# Patient Record
Sex: FEMALE | Race: BLACK OR AFRICAN AMERICAN | NOT HISPANIC OR LATINO | Employment: UNEMPLOYED | URBAN - METROPOLITAN AREA
[De-identification: names, ages, dates, MRNs, and addresses within clinical notes are randomized per-mention and may not be internally consistent; named-entity substitution may affect disease eponyms.]

---

## 2021-08-22 ENCOUNTER — HOSPITAL ENCOUNTER (EMERGENCY)
Facility: CLINIC | Age: 46
Discharge: HOME | End: 2021-08-22
Attending: EMERGENCY MEDICINE | Admitting: EMERGENCY MEDICINE
Payer: COMMERCIAL

## 2021-08-22 VITALS
HEART RATE: 64 BPM | TEMPERATURE: 98.3 F | SYSTOLIC BLOOD PRESSURE: 116 MMHG | RESPIRATION RATE: 16 BRPM | OXYGEN SATURATION: 100 % | DIASTOLIC BLOOD PRESSURE: 84 MMHG

## 2021-08-22 DIAGNOSIS — H16.201 KERATOCONJUNCTIVITIS OF RIGHT EYE: ICD-10-CM

## 2021-08-22 PROCEDURE — 250N000009 HC RX 250

## 2021-08-22 PROCEDURE — 99284 EMERGENCY DEPT VISIT MOD MDM: CPT | Performed by: EMERGENCY MEDICINE

## 2021-08-22 PROCEDURE — 99283 EMERGENCY DEPT VISIT LOW MDM: CPT | Performed by: EMERGENCY MEDICINE

## 2021-08-22 RX ORDER — PROPARACAINE HYDROCHLORIDE 5 MG/ML
1 SOLUTION/ DROPS OPHTHALMIC ONCE
Status: COMPLETED | OUTPATIENT
Start: 2021-08-22 | End: 2021-08-22

## 2021-08-22 RX ORDER — TOBRAMYCIN 3 MG/ML
2 SOLUTION/ DROPS OPHTHALMIC
Qty: 4 ML | Refills: 0 | Status: SHIPPED | OUTPATIENT
Start: 2021-08-22 | End: 2021-08-24

## 2021-08-22 RX ORDER — PROPARACAINE HYDROCHLORIDE 5 MG/ML
SOLUTION/ DROPS OPHTHALMIC
Status: COMPLETED
Start: 2021-08-22 | End: 2021-08-22

## 2021-08-22 RX ADMIN — PROPARACAINE HYDROCHLORIDE 1 DROP: 5 SOLUTION/ DROPS OPHTHALMIC at 17:15

## 2021-08-22 RX ADMIN — FLUORESCEIN SODIUM 1 STRIP: 1 STRIP OPHTHALMIC at 17:20

## 2021-08-22 ASSESSMENT — VISUAL ACUITY
OS: 20/200
OD: 20/200

## 2021-08-22 NOTE — ED PROVIDER NOTES
"  History     Chief Complaint   Patient presents with     Eye Problem     \"She got an eye injury from the sun, through the reflection off the car window last Saturday.\" Pain and swelling has incrased since then to right eye.      HPI  Sarah Andrade is a 46 year old female who presents to the ER with a painful right swollen eye that she has had for the last week.  Patient thinks she injured her eye from the sun through the reflection off the car window last Saturday and states the pain and swelling has increased since that time to her right eye.  Patient denies any fevers and states her left eye is uninvolved.    I have reviewed the Medications, Allergies, Past Medical and Surgical History, and Social History in the Epic system.    History reviewed. No pertinent past medical history.    History reviewed. No pertinent surgical history.         Past medical history, past surgical history, medications, and allergies were reviewed with the patient. Additional pertinent items: None    History reviewed. No pertinent family history.    Social History     Tobacco Use     Smoking status: Not on file   Substance Use Topics     Alcohol use: Not on file     Social history was reviewed with the patient. Additional pertinent items: None    No Known Allergies    Review of Systems  A complete review of systems was performed with pertinent positives and negatives noted in the HPI, and all other systems negative.    Physical Exam   BP: 119/78  Pulse: 70  Temp: 97.6  F (36.4  C)  Resp: 16  SpO2: 98 %      Physical Exam  Vitals and nursing note reviewed.   Constitutional:       Appearance: She is not ill-appearing.      Comments:  used   HENT:      Head: Atraumatic.   Eyes:      Comments: Patient's pupils are approximately 2 to 3 mm bilaterally and reactive.    The patient's left eye is within normal limits    The patient's right eye has some eyelid edema with significant chemosis of the right eye.  Slit-lamp exam reveals " the cornea to be diffusely inflamed with a keratitis.  Alcaine drops do relieve some of the pain.  Cell and flare is hard to detect but the right is not suspected.   Musculoskeletal:      Cervical back: Neck supple.   Neurological:      General: No focal deficit present.      Mental Status: She is alert and oriented to person, place, and time.   Psychiatric:         Mood and Affect: Mood normal.         ED Course        Procedures             Assessments & Plan (with Medical Decision Making)     I have reviewed the nursing notes.    Medications   fluorescein (FUL-JACQUE) 1 MG ophthalmic strip (has no administration in time range)   proparacaine (ALCAINE) 0.5 % ophthalmic solution (has no administration in time range)        I have reviewed the findings, diagnosis, plan and need for follow up with the patient.    New Prescriptions    TOBRAMYCIN (TOBREX) 0.3 % OPHTHALMIC SOLUTION    Place 2 drops into the right eye every 4 hours (while awake) for 7 days     Orders Placed This Encounter   Procedures     Adult Eye Referral         Final diagnoses:   Keratoconjunctivitis of right eye     Please fill your eyedrop prescription and start them as soon as possible.    A referral has been placed into the computer system for you to be seen in the eye clinic.  They should call you in the next 24 hours to schedule your appointment to be seen in the next 1 to 2 days.  If they don't call you please call them at Eye Clinic (phone: [phone]).    Work note given    Jimmie Scherer MD, MD    8/22/2021   [facility] EMERGENCY DEPARTMENT     Jimmie Scherer MD  08/22/21 0840

## 2021-08-22 NOTE — LETTER
August 22, 2021      To Whom It May Concern:      Sarah Andrade was seen in our Emergency Department today, 08/22/21.  I expect her condition to improve over the next several days.  She may return to work/school on 8/25/21.    Sincerely,        Jimmie Scherer MD, MD

## 2021-08-23 ENCOUNTER — TELEPHONE (OUTPATIENT)
Dept: OPHTHALMOLOGY | Facility: CLINIC | Age: 46
End: 2021-08-23

## 2021-08-23 NOTE — TELEPHONE ENCOUNTER
I called the patient with the help of  services.  I scheduled the patient for an appointment.  The patient asked that I call the emergency contact.  I called and confirmed location, date and time.

## 2021-08-23 NOTE — TELEPHONE ENCOUNTER
[facility] Call Center    Phone Message    May a detailed message be left on voicemail: no     Reason for Call: Appointment Intake    Referring Provider Name: Jimmie Scherer MD at [facility] Emergency Department  Diagnosis and/or Symptoms: Keratoconjunctivitis of right eye    Action Taken: Message routed to:  [facility] ([facility] OPHTHALMOLOGY [facility]ULT [facility] [[id number]] - per HFU protocols    Travel Screening: Not Applicable

## 2021-08-24 ENCOUNTER — TELEPHONE (OUTPATIENT)
Dept: OPHTHALMOLOGY | Facility: CLINIC | Age: 46
End: 2021-08-24

## 2021-08-24 ENCOUNTER — LAB (OUTPATIENT)
Dept: LAB | Facility: CLINIC | Age: 46
End: 2021-08-24
Payer: COMMERCIAL

## 2021-08-24 ENCOUNTER — ANCILLARY PROCEDURE (OUTPATIENT)
Dept: GENERAL RADIOLOGY | Facility: CLINIC | Age: 46
End: 2021-08-24
Attending: STUDENT IN AN ORGANIZED HEALTH CARE EDUCATION/TRAINING PROGRAM
Payer: COMMERCIAL

## 2021-08-24 ENCOUNTER — APPOINTMENT (OUTPATIENT)
Dept: INTERPRETER SERVICES | Facility: CLINIC | Age: 46
End: 2021-08-24
Payer: COMMERCIAL

## 2021-08-24 ENCOUNTER — OFFICE VISIT (OUTPATIENT)
Dept: OPHTHALMOLOGY | Facility: CLINIC | Age: 46
End: 2021-08-24
Attending: OPHTHALMOLOGY
Payer: COMMERCIAL

## 2021-08-24 DIAGNOSIS — H20.9 ANTERIOR UVEITIS: ICD-10-CM

## 2021-08-24 DIAGNOSIS — H20.9 ANTERIOR UVEITIS: Primary | ICD-10-CM

## 2021-08-24 DIAGNOSIS — H40.003 GLAUCOMA SUSPECT OF BOTH EYES: ICD-10-CM

## 2021-08-24 LAB — T PALLIDUM AB SER QL: NONREACTIVE

## 2021-08-24 PROCEDURE — 85549 MURAMIDASE: CPT | Mod: 90 | Performed by: PATHOLOGY

## 2021-08-24 PROCEDURE — 86780 TREPONEMA PALLIDUM: CPT | Mod: 90 | Performed by: PATHOLOGY

## 2021-08-24 PROCEDURE — G0463 HOSPITAL OUTPT CLINIC VISIT: HCPCS

## 2021-08-24 PROCEDURE — 36415 COLL VENOUS BLD VENIPUNCTURE: CPT | Performed by: PATHOLOGY

## 2021-08-24 PROCEDURE — 86481 TB AG RESPONSE T-CELL SUSP: CPT | Mod: 90 | Performed by: PATHOLOGY

## 2021-08-24 PROCEDURE — 92133 CPTRZD OPH DX IMG PST SGM ON: CPT | Performed by: STUDENT IN AN ORGANIZED HEALTH CARE EDUCATION/TRAINING PROGRAM

## 2021-08-24 PROCEDURE — 71046 X-RAY EXAM CHEST 2 VIEWS: CPT | Mod: GC | Performed by: RADIOLOGY

## 2021-08-24 PROCEDURE — 99204 OFFICE O/P NEW MOD 45 MIN: CPT | Mod: GC | Performed by: STUDENT IN AN ORGANIZED HEALTH CARE EDUCATION/TRAINING PROGRAM

## 2021-08-24 PROCEDURE — 82164 ANGIOTENSIN I ENZYME TEST: CPT | Mod: 90 | Performed by: PATHOLOGY

## 2021-08-24 RX ORDER — PREDNISOLONE ACETATE 10 MG/ML
1 SUSPENSION/ DROPS OPHTHALMIC
Qty: 30 ML | Refills: 1 | Status: SHIPPED | OUTPATIENT
Start: 2021-08-24 | End: 2021-08-30

## 2021-08-24 RX ORDER — CYCLOPENTOLATE HYDROCHLORIDE 10 MG/ML
1 SOLUTION/ DROPS OPHTHALMIC 3 TIMES DAILY
Qty: 5 ML | Refills: 0 | Status: SHIPPED | OUTPATIENT
Start: 2021-08-24 | End: 2021-08-24

## 2021-08-24 RX ORDER — ATROPINE SULFATE 10 MG/ML
1 SOLUTION/ DROPS OPHTHALMIC 2 TIMES DAILY
Qty: 2 ML | Refills: 0 | Status: SHIPPED | OUTPATIENT
Start: 2021-08-24 | End: 2021-08-30

## 2021-08-24 ASSESSMENT — SLIT LAMP EXAM - LIDS: COMMENTS: NORMAL

## 2021-08-24 ASSESSMENT — VISUAL ACUITY
OS_SC: 20/60
OD_SC: 20/30
OS_PH_SC: 20/30
METHOD: SNELLEN - LINEAR

## 2021-08-24 ASSESSMENT — CUP TO DISC RATIO
OD_RATIO: 0.7
OS_RATIO: 0.7

## 2021-08-24 ASSESSMENT — CONF VISUAL FIELD
OD_SUPERIOR_NASAL_RESTRICTION: 3
OD_INFERIOR_TEMPORAL_RESTRICTION: 3
OD_SUPERIOR_TEMPORAL_RESTRICTION: 3
OD_INFERIOR_NASAL_RESTRICTION: 3
OS_NORMAL: 1

## 2021-08-24 ASSESSMENT — EXTERNAL EXAM - LEFT EYE: OS_EXAM: NORMAL

## 2021-08-24 ASSESSMENT — TONOMETRY
OD_IOP_MMHG: 19
OS_IOP_MMHG: 12
IOP_METHOD: ICARE

## 2021-08-24 NOTE — NURSING NOTE
Chief Complaints and History of Present Illnesses   Patient presents with     Follow Up     Follow up from ED for Keratoconjunctivitis of right eye     Chief Complaint(s) and History of Present Illness(es)     Follow Up     Comments: Follow up from ED for Keratoconjunctivitis of right eye              Comments     Pt here with  over the phone today.  Pt here for follow up from ED for Keratoconjunctivitis of right eye.  Pt states vision is slightly better over the past 2 days.   Pt having throbbing eye pain in RE today. Pt also states swelling and redness is worse than 2 days ago.    Deonte Rajan [facility] August 24, 2021 8:11 AM

## 2021-08-24 NOTE — PROGRESS NOTES
"Chief Complaint(s) and History of Present Illness(es)     Follow Up     Comments: Follow up from ED for Keratoconjunctivitis of right eye      Comments     Pt here with  over the phone today.  Pt here for follow up from ED for Keratoconjunctivitis of right eye.  Pt states vision is slightly better over the past 2 days.   Pt having throbbing eye pain in RE today. Pt also states swelling and   redness is worse than 2 days ago.  Deonte RajanCHRISTOPHER August 24, 2021 8:11 AM    Patient presents to acute clinic for evaluation of right eye redness and pain. This started about 1.5 weeks ago. There was no trauma, but she believes this is associated with staring at the sun. The symptoms started gradually, first redness on Saturday and then pain on Tuesday. The pain is intermittent, and worse with bending over. The right eye is photophobic. The right eye tears, no thick discharge. Her right eye vision feels \"cloudy.\"  No recent illnesses. No history of allergies. No sick contacts. She has had similar episodes of right eye redness in the past, but her last episode [place],\" and prior episodes have resolved on their own. No shortness of breath, cough, joint aches, muscle aches, or fevers. No recent insect bites. No history of cold sores or mouth sores.     She started tobramycin on Sunday (2 days ago) after being seen at the ED, and believes that this helps the pain slightly.     No left eye issues or symptoms.     POH:  - History of right eye redness that resolved on its own in the past.   - Has never seen an eye doctor in the past.   - Glasses: Readers only.   - Contact lens wear: None  - Previous eye surgery/laser: None    PMH:   - No DM or HTN.   - No autoimmune diseases.     FH:   - No known family history of autoimmune conditions.   - No known family history of glaucoma or macular degeneration.     Review of systems for the eyes was negative other than the pertinent " positives/negatives listed in the HPI.      Assessment & Plan      Sarah Andrade is a 46 year old female with the following diagnoses:   1. Anterior uveitis - Right Eye    2. Glaucoma suspect of both eyes       First documented episode  ? Possible previously untreated episodes per report  Incidental cupping noted in both eyes   OCT RNFL of both eyes today shows good healthy RNFL in both eyes, suggesting large cups are anatomical rather than pathological, glaucoma less likely. Monitor      Plan:  - Will order the following studies for further assessment:   - ACE, lysozyme, CXR, syphillis, TB quant gold.   - Start Atropine BID in the right eye.  - Start Pred Forte q1h while awake in right eye.  - Plan for optos photos both eyes at next visit (difficult to view fundus in right eye today).   - Follow-up in about 1 week acute clinic.   - Return precautions discussed.     Patient disposition:   Return in about 1 week (around 8/31/2021) for Follow-up, V/T/D, optos photos of both eyes. .    Alberto Long MD  Ophthalmology Resident, PGY-2    Attending Physician Attestation:  Complete documentation of historical and exam elements from today's encounter can be found in the full encounter summary report (not reduplicated in this progress note).  I personally obtained the chief complaint(s) and history of present illness.  I confirmed and edited as necessary the review of systems, past medical/surgical history, family history, social history, and examination findings as documented by others; and I examined the patient myself.  I personally reviewed the relevant tests, images, and reports as documented above.  I formulated and edited as necessary the assessment and plan and discussed the findings and management plan with the patient and family. . - Johnny Moss MD

## 2021-08-24 NOTE — TELEPHONE ENCOUNTER
Atropine prescribed    No indication to pursue cyclopentolate PA further    Note to Dr Ethan Hughes, RN 12:43 PM 08/24/21

## 2021-08-24 NOTE — TELEPHONE ENCOUNTER
Does the provider still want the PA for the cyclopentolate.  It looks like it was discontinued and replaced by atropine

## 2021-08-24 NOTE — TELEPHONE ENCOUNTER
Prior Authorization Retail Medication Request    Medication/Dose: cyclopentolate 1%  ICD code (if different than what is on RX):  H209  Previously Tried and Failed:  unknown  Rationale:  unknown    Insurance Name:  [facility]  Insurance ID:  [id number]      Pharmacy Information (if different than what is on RX)  Name:  [facility]  Phone:  [phone]

## 2021-08-24 NOTE — PATIENT INSTRUCTIONS
- Start Atropine eyedrop 2 times per day in the right eye.  - Start Pred Forte eyedrop once every hour while awake in right eye.    - Stop taking the old antibiotic tobramycin eyedrop.     - Go to the laboratory to have your blood drawn. They will also schedule you to go get a chest X-ray.     - Schedule a follow-up appointment at the acute clinic for one week from now. Return sooner if issues arise.

## 2021-08-25 LAB
ACE SERPL-CCNC: 14 U/L
QUANTIFERON MITOGEN: 10 IU/ML
QUANTIFERON NIL TUBE: 0.13 IU/ML
QUANTIFERON TB1 TUBE: 1.2 IU/ML
QUANTIFERON TB2 TUBE: 1.01

## 2021-08-26 LAB
GAMMA INTERFERON BACKGROUND BLD IA-ACNC: 0.13 IU/ML
LYSOZYME SERPL-MCNC: 0.73 UG/ML
M TB IFN-G BLD-IMP: POSITIVE
M TB IFN-G CD4+ BCKGRND COR BLD-ACNC: 9.87 IU/ML
MITOGEN IGNF BCKGRD COR BLD-ACNC: 0.88 IU/ML
MITOGEN IGNF BCKGRD COR BLD-ACNC: 1.07 IU/ML

## 2021-08-30 ENCOUNTER — OFFICE VISIT (OUTPATIENT)
Dept: OPHTHALMOLOGY | Facility: CLINIC | Age: 46
End: 2021-08-30
Attending: STUDENT IN AN ORGANIZED HEALTH CARE EDUCATION/TRAINING PROGRAM
Payer: COMMERCIAL

## 2021-08-30 DIAGNOSIS — H20.9 ANTERIOR UVEITIS: Primary | ICD-10-CM

## 2021-08-30 DIAGNOSIS — R76.12 POSITIVE QUANTIFERON-TB GOLD TEST: ICD-10-CM

## 2021-08-30 DIAGNOSIS — H40.003 GLAUCOMA SUSPECT OF BOTH EYES: ICD-10-CM

## 2021-08-30 PROCEDURE — 99213 OFFICE O/P EST LOW 20 MIN: CPT | Mod: GC | Performed by: STUDENT IN AN ORGANIZED HEALTH CARE EDUCATION/TRAINING PROGRAM

## 2021-08-30 PROCEDURE — 92250 FUNDUS PHOTOGRAPHY W/I&R: CPT | Performed by: STUDENT IN AN ORGANIZED HEALTH CARE EDUCATION/TRAINING PROGRAM

## 2021-08-30 PROCEDURE — G0463 HOSPITAL OUTPT CLINIC VISIT: HCPCS

## 2021-08-30 RX ORDER — PREDNISOLONE ACETATE 10 MG/ML
1 SUSPENSION/ DROPS OPHTHALMIC
Qty: 30 ML | Refills: 1 | Status: SHIPPED | OUTPATIENT
Start: 2021-08-30 | End: 2021-09-13

## 2021-08-30 RX ORDER — ATROPINE SULFATE 10 MG/ML
1 SOLUTION/ DROPS OPHTHALMIC DAILY
Qty: 2 ML | Refills: 0 | Status: SHIPPED | OUTPATIENT
Start: 2021-08-30 | End: 2021-10-08

## 2021-08-30 ASSESSMENT — SLIT LAMP EXAM - LIDS
COMMENTS: NORMAL
COMMENTS: NORMAL

## 2021-08-30 ASSESSMENT — CUP TO DISC RATIO
OS_RATIO: 0.7
OD_RATIO: 0.7

## 2021-08-30 ASSESSMENT — VISUAL ACUITY
OS_SC: 20/60
METHOD: SNELLEN - LINEAR
OD_PH_SC: 20/30
OS_PH_SC: 20/30
OD_SC: 20/40
OD_SC+: -2

## 2021-08-30 ASSESSMENT — CONF VISUAL FIELD
OS_NORMAL: 1
OD_INFERIOR_TEMPORAL_RESTRICTION: 3
OD_INFERIOR_NASAL_RESTRICTION: 3
OD_SUPERIOR_TEMPORAL_RESTRICTION: 3
OD_SUPERIOR_NASAL_RESTRICTION: 3

## 2021-08-30 ASSESSMENT — EXTERNAL EXAM - LEFT EYE: OS_EXAM: NORMAL

## 2021-08-30 ASSESSMENT — TONOMETRY
OS_IOP_MMHG: 14
IOP_METHOD: TONOPEN
OD_IOP_MMHG: 13

## 2021-08-30 NOTE — PATIENT INSTRUCTIONS
- Take the pred forte eyedrop once every 2 hours in the right eye while awake.   - Take the atropine eyedrop once per day in the right eye.     - Please schedule follow-up appointment at the uveitis eye clinic at the [facility] in about 2 weeks.     - You were also given a referral to the infectious disease clinic. Please make an appointment at this clinic to discuss your positive TB test.

## 2021-08-30 NOTE — PROGRESS NOTES
"HPI:  Sarah Andrade is a 46 year old female who presents to acute eye clinic for follow-up of uveitis.     Initial HPI below:  Patient presented to acute clinic for evaluation of right eye redness and pain. This started about in early to mid August. There was no trauma, but she believes this is associated with staring at the sun. The symptoms started gradually, first redness on Saturday and then pain on Tuesday. The pain is intermittent, and worse with bending over. The right eye is photophobic. The right eye tears, no thick discharge. Her right eye vision feels \"cloudy.\"  No recent illnesses. No history of allergies. No sick contacts. She has had similar episodes of right eye redness in the past, but her last episode was years ago [place],\" and prior episodes have resolved on their own. No shortness of breath, cough, joint aches, muscle aches, or fevers. No recent insect bites. No history of cold sores or mouth sores.    Interval Hx 8/30/21:  Today Sarah feels that her vision has improved since last week, and her eye pain is resolved. Her eyes feel back to normal. No new flashes or floaters. She has been taking her drops as directed.      POH  - History of right eye redness that resolved on its own in the past.   - Has never seen an eye doctor in the past.   - Glasses: Readers only.   - Contact lens wear: None.  - Previous eye surgery/laser: None    GTTS  - Atropine BID right eye  - Pred Forte q1h while awake right eye    PMH  - No DM or HTN. Has received bCG vaccination.   - No autoimmune diseases.    FH  - No known family history of autoimmune conditions.   - No known family history of glaucoma or macular degeneration.     Assessment & Plan     # Acute anterior uveitis, right eye  Overall improved today, TB quant gold positive, chest x-ray unremarkable. Negative for ACE, lysozyme, and syphilis. Possible previously untreated prior episodes per patient report. Improved symptoms today after taking " atropine BID in the right eye and pred forte q1h while awake in the right eye. Overall low suspicion for TB as etiology for anterior uveitis, but will plan on referral to infectious disease clinic as below.   Plan:  - Decrease pred forte eyedrop to q2h in the right eye while awake.   - Decrease atropine to once a day in the right eye  - Follow-up in uveitis clinic in about 2 weeks.   - Return precautions discussed.    # Positive TB Quant Gold test  Patient does not have any history of suspicious illness, no history of TB in the past, no treatment for TB in the past. She recalls an exposure to TB in the past, almost 13 years ago. Patient is from \[facility]". CXR did not show evidence of disease. Discussed with patient and her Brother that bCG vaccine should not result in Quantiferon positivity.     Plan:  - Refer to infectious disease clinic to consider perhaps at least prophylactic treatment. Uveitis has responded to topical steroid so do not anticipate oral steroid will be necessary    # Glaucoma suspect of both eyes  OCT RNFL of both eyes 8/24/21 qirh healthy RNFL in both eyes, suggesting large cups are anatomical rather than pathological, glaucoma less likely. Intraocular pressures within normal limits.   - Monitor without drops for eye pressure     -----------------------------------------------------------------------------------    Patient disposition:   Return in about 2 weeks (around 9/13/2021) for Follow up, uveitis clinic V/T/D. with Destiney    Patient seen and discussed with Dr. Cabello.    Alberto Long MD  Ophthalmology Resident, PGY-2    Attending Physician Attestation:  Complete documentation of historical and exam elements from today's encounter can be found in the full encounter summary report (not reduplicated in this progress note). I reviewed the chief complaint(s) and history of present illness, and  confirmed and edited as necessary the review of systems, past medical/surgical history, family  history, social history, and examination findings as documented by others and the treating Resident or Fellow Physician.    I examined the patient myself, discussed the findings, reviewed all ancillary testing data and modified these results and reports along with the assessment and plan with the Treating Resident or Fellow Physician. I agree with the note as detailed above.   Dontae Cabello M.D.  Uveitis and Medical Retina  August 30, 2021

## 2021-08-30 NOTE — NURSING NOTE
Chief Complaints and History of Present Illnesses   Patient presents with     Uveitis Follow-Up     1 week follow up Anterior Uveitis.     Chief Complaint(s) and History of Present Illness(es)     Uveitis Follow-Up     Comments: 1 week follow up Anterior Uveitis.              Comments     Pt here with  over the phone today.  Pt states vision has improved since last week.  No eye pain today. No new flashes or floaters.    CHRISTOPHER Roland August 30, 2021 8:52 AM

## 2021-08-31 ENCOUNTER — APPOINTMENT (OUTPATIENT)
Dept: INTERPRETER SERVICES | Facility: CLINIC | Age: 46
End: 2021-08-31
Payer: COMMERCIAL

## 2021-09-02 ENCOUNTER — APPOINTMENT (OUTPATIENT)
Dept: INTERPRETER SERVICES | Facility: CLINIC | Age: 46
End: 2021-09-02
Payer: COMMERCIAL

## 2021-09-13 ENCOUNTER — OFFICE VISIT (OUTPATIENT)
Dept: OPHTHALMOLOGY | Facility: CLINIC | Age: 46
End: 2021-09-13
Attending: OPHTHALMOLOGY
Payer: COMMERCIAL

## 2021-09-13 DIAGNOSIS — R76.12 POSITIVE QUANTIFERON-TB GOLD TEST: ICD-10-CM

## 2021-09-13 DIAGNOSIS — H20.00 ACUTE ANTERIOR UVEITIS OF RIGHT EYE: Primary | ICD-10-CM

## 2021-09-13 DIAGNOSIS — H40.003 GLAUCOMA SUSPECT OF BOTH EYES: ICD-10-CM

## 2021-09-13 DIAGNOSIS — H20.9 ANTERIOR UVEITIS: ICD-10-CM

## 2021-09-13 PROCEDURE — 99214 OFFICE O/P EST MOD 30 MIN: CPT | Performed by: OPHTHALMOLOGY

## 2021-09-13 PROCEDURE — G0463 HOSPITAL OUTPT CLINIC VISIT: HCPCS

## 2021-09-13 RX ORDER — PREDNISOLONE ACETATE 10 MG/ML
SUSPENSION/ DROPS OPHTHALMIC
Qty: 5 ML | Refills: 2 | Status: SHIPPED | OUTPATIENT
Start: 2021-09-13 | End: 2021-10-08

## 2021-09-13 ASSESSMENT — EXTERNAL EXAM - LEFT EYE: OS_EXAM: NORMAL

## 2021-09-13 ASSESSMENT — CONF VISUAL FIELD
OD_INFERIOR_NASAL_RESTRICTION: 3
OD_INFERIOR_TEMPORAL_RESTRICTION: 3
OD_SUPERIOR_TEMPORAL_RESTRICTION: 3
OD_SUPERIOR_NASAL_RESTRICTION: 3
OS_NORMAL: 1
METHOD: COUNTING FINGERS

## 2021-09-13 ASSESSMENT — VISUAL ACUITY
OD_SC: 20/40
METHOD: SNELLEN - LINEAR
OS_SC+: -1
OD_PH_SC: 20/30
OS_SC: 20/30

## 2021-09-13 ASSESSMENT — SLIT LAMP EXAM - LIDS
COMMENTS: NORMAL
COMMENTS: NORMAL

## 2021-09-13 ASSESSMENT — CUP TO DISC RATIO
OS_RATIO: 0.7
OD_RATIO: 0.7

## 2021-09-13 ASSESSMENT — TONOMETRY
OS_IOP_MMHG: 16
OD_IOP_MMHG: 13
IOP_METHOD: TONOPEN

## 2021-09-13 NOTE — LETTER
9/13/2021        RE: Sarah Andrade  [address]  [place] [address]     Dear Colleague,    Thank you for referring your patient, Sarah Andrade, to the [facility] EYE [facility] at [facility]. Please see a copy of my visit note below.    Chief Complaint/Presenting Concern: Uveitis follow-up    Interval History of Present Ocular Illness:  Sarah Andrade is a 46 year old patient who returns for follow up of her acute anterior uveitis of the right eye.  She was last seen on August 30, 2021 at which time she has improved inflammation in the right eye and normal eye pressure.  We discussed the positive QuantiFERON gold blood test and recommended consultation with infectious disease which is scheduled in a few weeks.    Since that visit, Ms. Andrade reports that she ran out of the drops one week ago as she ran out of the medicines. She reports mild cloudiness of the vision in the right eye, but no pain. Left eye still fine.     Interval Updates to Medical/Family/Social History:  No other changes to health or medications    Relevant Review of Systems Updates:  No coughs/colds, no fevers.    Laboratory Testing: None since last visit     Current eye related medications: No drops in 1 week (ran out)    Retina/Uveitis Imaging: No imaging today    Assessment:     1. Acute anterior uveitis of right eye  Some increase in symptoms likely due to persistent uveitis off drops for one week.     2. Glaucoma suspect of both eyes  Due to cupping but normal intraocular pressure in both eyes    3. Positive QuantiFERON-TB Gold test  Scheduled for infectious disease consultation next month for likely latent tuberculosis    Plan/Recommendations:      Discussed findings with patient and her Aunt who helped to translate in Occitan. There is still active uveitis in the right eye as drops not used last week. We anticipate that by restarting drops, that things will improve.      Eye pressure is 13, 16 today    Additional lab testing: None at this time.     Restart prednisolone drop 6x/day in the right eye    As pupil still dilated, no need to restart Atropine     Regarding the positive QuantiFERON test with a negative chest x-ray, this may not specifically be related to the uveitis.     RTC 2 weeks tonopen, no dilation, no testing    Physician Attestation     Attending Physician Attestation:  Complete documentation of historical and exam elements from today's encounter can be found in the full encounter summary report (not reduplicated in this progress note). I personally obtained the chief complaint(s) and history of present illness. I confirmed and edited as necessary the review of systems, past medical/surgical history, family history, social history, and examination findings as documented by others; and I examined the patient myself. I personally reviewed the relevant tests, images, and reports as documented above. I formulated and edited as necessary the assessment and plan and discussed the findings and management plan with the patient and family members present at the time of this visit.  Dontae Cabello M.D., Uveitis and Medical Retina, September 13, 2021     Again, thank you for allowing me to participate in the care of your patient.      Sincerely,    Dontae Cabello MD  [facility] Dept of Ophthalmology  Uveitis and Medical Retina

## 2021-09-13 NOTE — NURSING NOTE
Chief Complaints and History of Present Illnesses   Patient presents with     Uveitis Evaluation     Chief Complaint(s) and History of Present Illness(es)     Uveitis Evaluation     Laterality: right eye    Onset: gradual    Onset: months ago    Quality: States the va is very good      Severity: moderate    Frequency: intermittently    Associated symptoms: photophobia.  Negative for flashes and floaters    Pain scale: 0/10              Comments     Here for Anterior uveitis - Right Eye   Has not used the gtts for the past 7 days  Sloane Douglass COT 9:04 AM September 13, 2021

## 2021-09-13 NOTE — PROGRESS NOTES
Chief Complaint/Presenting Concern: Uveitis follow-up    Interval History of Present Ocular Illness:  Sarah Andrade is a 46 year old patient who returns for follow up of her acute anterior uveitis of the right eye.  She was last seen on August 30, 2021 at which time she has improved inflammation in the right eye and normal eye pressure.  We discussed the positive QuantiFERON gold blood test and recommended consultation with infectious disease which is scheduled in a few weeks.    Since that visit, Ms. Andrade reports that she ran out of the drops one week ago as she ran out of the medicines. She reports mild cloudiness of the vision in the right eye, but no pain. Left eye still fine.     Interval Updates to Medical/Family/Social History:  No other changes to health or medications    Relevant Review of Systems Updates:  No coughs/colds, no fevers.    Laboratory Testing: None since last visit     Current eye related medications: No drops in 1 week (ran out)    Retina/Uveitis Imaging: No imaging today    Assessment:     1. Acute anterior uveitis of right eye  Some increase in symptoms likely due to persistent uveitis off drops for one week.     2. Glaucoma suspect of both eyes  Due to cupping but normal intraocular pressure in both eyes    3. Positive QuantiFERON-TB Gold test  Scheduled for infectious disease consultation next month for likely latent tuberculosis    Plan/Recommendations:      Discussed findings with patient and her Aunt who helped to translate in Vietnamese. There is still active uveitis in the right eye as drops not used last week. We anticipate that by restarting drops, that things will improve.     Eye pressure is 13, 16 today    Additional lab testing: None at this time.     Restart prednisolone drop 6x/day in the right eye    As pupil still dilated, no need to restart Atropine     Regarding the positive QuantiFERON test with a negative chest x-ray, this may not specifically be related to the  uveitis.     RTC 2 weeks tonopen, no dilation, no testing    Physician Attestation     Attending Physician Attestation:  Complete documentation of historical and exam elements from today's encounter can be found in the full encounter summary report (not reduplicated in this progress note). I personally obtained the chief complaint(s) and history of present illness. I confirmed and edited as necessary the review of systems, past medical/surgical history, family history, social history, and examination findings as documented by others; and I examined the patient myself. I personally reviewed the relevant tests, images, and reports as documented above. I formulated and edited as necessary the assessment and plan and discussed the findings and management plan with the patient and family members present at the time of this visit.  Dontae Cabello M.D., Uveitis and Medical Retina, September 13, 2021

## 2021-09-13 NOTE — PATIENT INSTRUCTIONS
Please restart the steroid drop with the white cap (Prednislone) 6x/day in the right eye.     No need to restart atropine (red top) in the right eye

## 2021-09-13 NOTE — LETTER
September 13, 2021      Re: Sarah Andrade   1975    To Whom It May Concern:    This is to confirm that the above patient was seen on 9/13/2021.  Sarah Andrade is able to return to work tomorrow without restrictions.    Thank you for your cooperation in this matter.  Please do not hesitate to contact me if you have any further questions.    Sincerely,         FRANK GAONA,

## 2021-09-15 NOTE — TELEPHONE ENCOUNTER
RECORDS RECEIVED FROM: Internal   DATE RECEIVED: 10.12.2021    NOTES (Gather within 2 years) STATUS DETAILS   OFFICE NOTE from referring provider   Internal 08.30.2021 Alberto Long MD   OFFICE NOTE from other specialist N/A    DISCHARGE SUMMARY from hospital N/A    DISCHARGE REPORT from the ER N/A    LABS (any labs) Internal    MEDICATION LIST Internal    IMAGING  (NEED IMAGES AND REPORTS)     Osteomyelitis: Foot imaging  N/A    Liver Abscess: Abdominal imaging N/A    Other (anything related to diagnoses Internal 08.24.2021 XR CHEST 2 VW

## 2021-10-08 ENCOUNTER — OFFICE VISIT (OUTPATIENT)
Dept: OPHTHALMOLOGY | Facility: CLINIC | Age: 46
End: 2021-10-08
Attending: OPHTHALMOLOGY
Payer: COMMERCIAL

## 2021-10-08 DIAGNOSIS — H20.00 ACUTE ANTERIOR UVEITIS OF RIGHT EYE: Primary | ICD-10-CM

## 2021-10-08 DIAGNOSIS — R76.12 POSITIVE QUANTIFERON-TB GOLD TEST: ICD-10-CM

## 2021-10-08 DIAGNOSIS — H40.003 GLAUCOMA SUSPECT OF BOTH EYES: ICD-10-CM

## 2021-10-08 PROCEDURE — 99213 OFFICE O/P EST LOW 20 MIN: CPT | Performed by: OPHTHALMOLOGY

## 2021-10-08 PROCEDURE — G0463 HOSPITAL OUTPT CLINIC VISIT: HCPCS

## 2021-10-08 RX ORDER — PREDNISOLONE ACETATE 10 MG/ML
SUSPENSION/ DROPS OPHTHALMIC
Qty: 5 ML | Refills: 3 | Status: SHIPPED | OUTPATIENT
Start: 2021-10-08 | End: 2021-11-12

## 2021-10-08 ASSESSMENT — VISUAL ACUITY
METHOD: SNELLEN - LINEAR
OD_SC: 20/30
OS_SC: 20/40
OS_SC+: +1
OS_PH_SC: 20/30
OD_SC+: +1

## 2021-10-08 ASSESSMENT — CONF VISUAL FIELD
OS_NORMAL: 1
OD_NORMAL: 1
METHOD: TOYS

## 2021-10-08 ASSESSMENT — TONOMETRY
OS_IOP_MMHG: 14
IOP_METHOD: TONOPEN
OD_IOP_MMHG: 12

## 2021-10-08 ASSESSMENT — CUP TO DISC RATIO
OD_RATIO: 0.7
OS_RATIO: 0.7

## 2021-10-08 ASSESSMENT — EXTERNAL EXAM - LEFT EYE: OS_EXAM: NORMAL

## 2021-10-08 ASSESSMENT — SLIT LAMP EXAM - LIDS
COMMENTS: NORMAL
COMMENTS: NORMAL

## 2021-10-08 ASSESSMENT — EXTERNAL EXAM - RIGHT EYE: OD_EXAM: NORMAL

## 2021-10-08 NOTE — PROGRESS NOTES
Chief Complaint/Presenting Concern:  Uveitis follow up    Interval History of Present Ocular Illness:  Sarah Andrade is a 46 year old patient who returns for follow up of her acute anterior uveitis of the right eye. At that  Visit, the inflammation was still active in the right eye being off drops for one week, so we recommended restarting steroid eye drops and returning today.    Since then, she has been doing the drop every 3 hours in the right eye and doing well. Left eye fine.    Interval Updates to Medical/Family/Social History:  No health changes. Has Phone Visit with Dr. Harris in Infectious Disease next week.    Relevant Review of Systems Updates:  No coughs, no trouble breathing    Laboratory Testing: No imaging     Current eye related medications: Prednisolone every 3 hours right eye (roughly 4 times in the day), no other drops    Retina/Uveitis Imaging: No imaging today    Assessment:     1. Acute anterior uveitis of right eye  Improving but not yet inactive    2. Glaucoma suspect of both eyes  With normal IOP in each eye     3. Positive QuantiFERON-TB Gold test  Awaiting Infectious Disease Consultation    Plan/Recommendations:      Discussed findings with patient and her Aunt, who Interpreted in Upper sorbian. Inflammation improving but not yet inactive. We will continue this frequency for one more week, then taper.    Eye pressure is 12, 14    Additional lab testing: None at this time. Regarding the Quantiferon positivity, this will be addressed by Dr. Harris. Overall, this does not appear to be TB uveitis, so 4 drug therapy may not be indicated based on eyes alone. We would ask Dr. Harris to consider prophylactic therapy if she feels appropriate, as this might reduce likelihood of recurrence of the uveitis.     Continue current medications in the right eye: Prednisolone every 3 hours (4x/day) for one more week, then every 4 hours (3x/day) until next visit    No atropine or other medicines at this time    RTC  4 weeks tonopen, no dilation, no testing    Physician Attestation     Attending Physician Attestation:  Complete documentation of historical and exam elements from today's encounter can be found in the full encounter summary report (not reduplicated in this progress note). I personally obtained the chief complaint(s) and history of present illness. I confirmed and edited as necessary the review of systems, past medical/surgical history, family history, social history, and examination findings as documented by others; and I examined the patient myself. I personally reviewed the relevant tests, images, and reports as documented above. I formulated and edited as necessary the assessment and plan and discussed the findings and management plan with the patient and family members present at the time of this visit.  Dontae Cabello M.D., Uveitis and Medical Retina, October 8, 2021

## 2021-10-08 NOTE — PATIENT INSTRUCTIONS
Continue current medications in the right eye: Prednisolone every 3 hours (4x/day) for one more week, then every 4 hours (3x/day) until next visit

## 2021-10-08 NOTE — NURSING NOTE
Chief Complaints and History of Present Illnesses   Patient presents with     Uveitis Follow-Up     Chief Complaint(s) and History of Present Illness(es)     Uveitis Follow-Up     Laterality: right eye    Onset: gradual    Onset: months ago    Quality: States va is the same since last visit      Severity: moderate    Frequency: intermittently    Associated symptoms: Negative for floaters, flashes and photophobia    Treatments tried: eye drops    Pain scale: 0/10              Comments     Here for Acute anterior uveitis of right eye   prednisolone 6x/day in the right eye  Sloane Douglass COT 8:04 AM October 8, 2021

## 2021-10-08 NOTE — LETTER
10/8/2021       RE: Sarah Andrade  [address]  [place] [address]     Dear Colleague,    Thank you for referring your patient, Sarah Andrade, to the [facility] at [facility]. Please see a copy of my visit note below.    Chief Complaint/Presenting Concern:  Uveitis follow up    Interval History of Present Ocular Illness:  Sarah Andrade is a 46 year old patient who returns for follow up of her acute anterior uveitis of the right eye. At that  Visit, the inflammation was still active in the right eye being off drops for one week, so we recommended restarting steroid eye drops and returning today.    Since then, she has been doing the drop every 3 hours in the right eye and doing well. Left eye fine.    Interval Updates to Medical/Family/Social History:  No health changes. Has Phone Visit with Dr. Harris in Infectious Disease next week.    Relevant Review of Systems Updates:  No coughs, no trouble breathing    Laboratory Testing: No imaging     Current eye related medications: Prednisolone every 3 hours right eye (roughly 4 times in the day), no other drops    Retina/Uveitis Imaging: No imaging today    Assessment:     1. Acute anterior uveitis of right eye  Improving but not yet inactive    2. Glaucoma suspect of both eyes  With normal IOP in each eye     3. Positive QuantiFERON-TB Gold test  Awaiting Infectious Disease Consultation    Plan/Recommendations:      Discussed findings with patient and her Aunt, who Interpreted in [place]ian. Inflammation improving but not yet inactive. We will continue this frequency for one more week, then taper.    Eye pressure is 12, 14    Additional lab testing: None at this time. Regarding the Quantiferon positivity, this will be addressed by Dr. Harris. Overall, this does not appear to be TB uveitis, so 4 drug therapy may not be indicated based on eyes alone. We would ask Dr. Harris to consider  prophylactic therapy if she feels appropriate, as this might reduce likelihood of recurrence of the uveitis.     Continue current medications in the right eye: Prednisolone every 3 hours (4x/day) for one more week, then every 4 hours (3x/day) until next visit    No atropine or other medicines at this time    RTC 4 weeks tonopen, no dilation, no testing    Physician Attestation     Attending Physician Attestation:  Complete documentation of historical and exam elements from today's encounter can be found in the full encounter summary report (not reduplicated in this progress note). I personally obtained the chief complaint(s) and history of present illness. I confirmed and edited as necessary the review of systems, past medical/surgical history, family history, social history, and examination findings as documented by others; and I examined the patient myself. I personally reviewed the relevant tests, images, and reports as documented above. I formulated and edited as necessary the assessment and plan and discussed the findings and management plan with the patient and family members present at the time of this visit.  Dontae Cabello M.D., Uveitis and Medical Retina, October 8, 2021     Again, thank you for allowing me to participate in the care of your patient.      Sincerely,    Dontae Cabello MD  [facility] Dept of Ophthalmology  Uveitis and Medical Retina

## 2021-10-12 ENCOUNTER — VIRTUAL VISIT (OUTPATIENT)
Dept: INFECTIOUS DISEASES | Facility: CLINIC | Age: 46
End: 2021-10-12
Attending: STUDENT IN AN ORGANIZED HEALTH CARE EDUCATION/TRAINING PROGRAM
Payer: COMMERCIAL

## 2021-10-12 ENCOUNTER — PRE VISIT (OUTPATIENT)
Dept: INFECTIOUS DISEASES | Facility: CLINIC | Age: 46
End: 2021-10-12

## 2021-10-12 DIAGNOSIS — R76.12 POSITIVE QUANTIFERON-TB GOLD TEST: ICD-10-CM

## 2021-10-12 DIAGNOSIS — H20.9 ANTERIOR UVEITIS: ICD-10-CM

## 2021-10-12 PROCEDURE — 99205 OFFICE O/P NEW HI 60 MIN: CPT | Mod: 95 | Performed by: INTERNAL MEDICINE

## 2021-10-12 ASSESSMENT — PAIN SCALES - GENERAL: PAINLEVEL: NO PAIN (0)

## 2021-10-12 NOTE — LETTER
10/12/2021       RE: Sarah Andrade  [address]  [place] [address]     Dear Colleague,    Thank you for referring your patient, Sarah Andrade, to the [facility] INFECTI[facility] DISEASE CLINIC [place] at [facility]. Please see a copy of my visit note below.    Sarah is a 46 year old who is being evaluated via a billable telephone visit.      What phone number would you like to be contacted at? [phone]  How would you like to obtain your AVS? Mail a copy  Phone call duration: 42 minutes    Phone call 8:55 am to 9:37 am with Upper sorbian .   placing orders, and chart review and documentation 30 minutes.  Total time the same day as visit:  72 minutes.          GENERAL ID Clinic New Patient CONSULTATION     Patient:  Sarah Andrade   Date of birth 1975, Medical record number [id number]  Date of Visit:  10/12/2021    Consult Requester:Alberto Long MD            Assessment and Recommendations:   ASSESSMENT:  1. Concern for active versus latent TB. Patient with recent diagnosis of uveitis which is suggestive of active TB not just latent TB. She is otherwise asymptomatic with a negative chest xray, also not cough. Therefore I am not concerned for possible active pulmonary TB.       RECOMMENDATION:  1. Need to check the following labs: CMP, CBC, ESR, CRP, HIV, Hepatitis B and C.   2. Need to do a weight check and documentation in clinic.   3. Schedule follow-up visit in two weeks.  4. Will discuss the treatment of active versus latent TB with Dr. Cabello.  5. Decide on final treatment plan after above completed.       Lesly Harris MD    ________________________________________________________________    Consult Question:. Patient is referred by Dr. Cabello for evaluation for positive quantiferon Gold TB assay and recent diagnosis of uveitis.            History of Present Illness:      Patient is a 46 y.o. man with a  history of uveitis and positive quantiferon Gold TB assay. Patient denies past history of TB or positive test for TB. She was exposed to an extended family member with TB in [place] 13 years ago.     Currently she feels well except for the recent eye pain and light sensitivity. Denies cough, fever, night sweats, weight loss.    Recent culture results include:  No results found for: CULT           Review of Systems:   CONSTITUTIONAL:  No fevers or chills  EYES: positive for recent pain in eye and sun sensitivity.   ENT:  negative for hearing loss, tinnitus and sore throat  RESPIRATORY:  negative for cough with sputum and dyspnea  CARDIOVASCULAR:  negative for chest pain, dyspnea  GASTROINTESTINAL:  negative for nausea, vomiting, diarrhea and constipation  GENITOURINARY:  negative for dysuria  HEME:  No easy bruising  INTEGUMENT:  negative for rash and pruritus  NEURO:  Negative for headache           Past Medical History:     Past Medical History:   Diagnosis Date     Acute anterior uveitis of right eye      Glaucoma suspect of both eyes      Positive QuantiFERON-TB Gold test             Past Surgical History:   No past surgical history on file.         Family History:     Family History   Problem Relation Age of Onset     Glaucoma No family hx of      Macular Degeneration No family hx of             Social History:     Social History     Tobacco Use     Smoking status: Never Smoker     Smokeless tobacco: Never Used   Substance Use Topics     Alcohol use: Not on file     History   Sexual Activity     Sexual activity: Not on file   Not .  No children.  From [place] lived in  for 6 years.   Works in cleaning services.         Current Medications (antimicrobials listed in bold):   Eye drops.    Current Outpatient Medications:      Cholecalciferol (VITAMIN D3 PO), Take by mouth daily, Disp: , Rfl:      Glycerin-Polysorbate 80 (REFRESH DRY EYE THERAPY OP), , Disp: , Rfl:      prednisoLONE acetate (PRED FORTE) 1  % ophthalmic suspension, Right eye: 1 drop 4x/day until 10/15, then 1 drop 3x/day until next visit, Disp: 5 mL, Rfl: 3           Allergies:   No Known Allergies         Physical Exam:         Physical Examination:    Physical Exam   healthy, alert and no distress  PSYCH: Alert and oriented times 3; coherent speech, normal   rate and volume, able to articulate logical thoughts, able   to abstract reason, no tangential thoughts, no hallucinations   or delusions  Her affect is normal  RESP: No cough, no audible wheezing, able to talk in full sentences  Remainder of exam unable to be completed due to telephone visits           Laboratory Data:     Inflammatory Markers  No lab results found.    Hematology Studies  No lab results found.    Hepatic Studies  No lab results found.    Lab on 08/24/2021   Component Date Value Ref Range Status     Angiotensin Converting Enzyme 08/24/2021 14  9 - 67 U/L Final     Lysozyme, Serum 08/24/2021 0.73  <=2.75 ug/mL Final     Treponema Antibody Total 08/24/2021 Nonreactive  Nonreactive Final     Quantiferon Nil Tube 08/24/2021 0.13  IU/mL Final     Quantiferon TB1 Tube 08/24/2021 1.20  IU/mL Final     Quantiferon TB2 Tube 08/24/2021 1.01   Final     Quantiferon Mitogen 08/24/2021 10.00  IU/mL Final     Quantiferon-TB Gold Plus 08/24/2021 Positive* Negative Final    Interferon gamma response to M.tuberculosis antigens was detected,suggesting infection with M.tuberculosis. Positive results in patients at low risk for infection should be interpreted with caution and repeat testing on a new sample should be considered as recommended by the 2017 ATS/IDSA/CDC Clinical Prac  kristen Guidelines for Diagnosis of Tuberculosis in Adults and Children      TB1 Ag minus Nil Value 08/24/2021 1.07  IU/mL Final     TB2 Ag minus Nil Value 08/24/2021 0.88  IU/mL Final     Mitogen minus Nil Result 08/24/2021 9.87  IU/mL Final     Nil Result 08/24/2021 0.13  IU/mL Final

## 2021-10-12 NOTE — PROGRESS NOTES
Sarah is a 46 year old who is being evaluated via a billable telephone visit.      What phone number would you like to be contacted at? [phone]  How would you like to obtain your AVS? Mail a copy  Phone call duration: 42 minutes    Phone call 8:55 am to 9:37 am with Croatian .   placing orders, and chart review and documentation 30 minutes.  Total time the same day as visit:  72 minutes.          GENERAL ID Clinic New Patient CONSULTATION     Patient:  Sarah Andrade   Date of birth 1975, Medical record number [id number]  Date of Visit:  10/12/2021    Consult Requester:Alberto Long MD            Assessment and Recommendations:   ASSESSMENT:  1. Concern for active versus latent TB. Patient with recent diagnosis of uveitis which is suggestive of active TB not just latent TB. She is otherwise asymptomatic with a negative chest xray, also not cough. Therefore I am not concerned for possible active pulmonary TB.       RECOMMENDATION:  1. Need to check the following labs: CMP, CBC, ESR, CRP, HIV, Hepatitis B and C.   2. Need to do a weight check and documentation in clinic.   3. Schedule follow-up visit in two weeks.  4. Will discuss the treatment of active versus latent TB with Dr. Cabello.  5. Decide on final treatment plan after above completed.       Lesly Harris MD    ________________________________________________________________    Consult Question:. Patient is referred by Dr. Cabello for evaluation for positive quantiferon Gold TB assay and recent diagnosis of uveitis.            History of Present Illness:      Patient is a 46 y.o. man with a history of uveitis and positive quantiferon Gold TB assay. Patient denies past history of TB or positive test for TB. She was exposed to an extended family member with TB in [place] 13 years ago.     Currently she feels well except for the recent eye pain and light sensitivity. Denies cough, fever, night sweats, weight loss.    Recent  culture results include:  No results found for: CULT           Review of Systems:   CONSTITUTIONAL:  No fevers or chills  EYES: positive for recent pain in eye and sun sensitivity.   ENT:  negative for hearing loss, tinnitus and sore throat  RESPIRATORY:  negative for cough with sputum and dyspnea  CARDIOVASCULAR:  negative for chest pain, dyspnea  GASTROINTESTINAL:  negative for nausea, vomiting, diarrhea and constipation  GENITOURINARY:  negative for dysuria  HEME:  No easy bruising  INTEGUMENT:  negative for rash and pruritus  NEURO:  Negative for headache           Past Medical History:     Past Medical History:   Diagnosis Date     Acute anterior uveitis of right eye      Glaucoma suspect of both eyes      Positive QuantiFERON-TB Gold test             Past Surgical History:   No past surgical history on file.         Family History:     Family History   Problem Relation Age of Onset     Glaucoma No family hx of      Macular Degeneration No family hx of             Social History:     Social History     Tobacco Use     Smoking status: Never Smoker     Smokeless tobacco: Never Used   Substance Use Topics     Alcohol use: Not on file     History   Sexual Activity     Sexual activity: Not on file   Not .  No children.  From [facility] [place], lived in  for 6 years.   Works in cleaning services.         Current Medications (antimicrobials listed in bold):   Eye drops.    Current Outpatient Medications:      Cholecalciferol (VITAMIN D3 PO), Take by mouth daily, Disp: , Rfl:      Glycerin-Polysorbate 80 (REFRESH DRY EYE THERAPY OP), , Disp: , Rfl:      prednisoLONE acetate (PRED FORTE) 1 % ophthalmic suspension, Right eye: 1 drop 4x/day until 10/15, then 1 drop 3x/day until next visit, Disp: 5 mL, Rfl: 3           Allergies:   No Known Allergies         Physical Exam:         Physical Examination:    Physical Exam   healthy, alert and no distress  PSYCH: Alert and oriented times 3; coherent speech, normal   rate and  volume, able to articulate logical thoughts, able   to abstract reason, no tangential thoughts, no hallucinations   or delusions  Her affect is normal  RESP: No cough, no audible wheezing, able to talk in full sentences  Remainder of exam unable to be completed due to telephone visits           Laboratory Data:     Inflammatory Markers  No lab results found.    Hematology Studies  No lab results found.    Hepatic Studies  No lab results found.    Lab on 08/24/2021   Component Date Value Ref Range Status     Angiotensin Converting Enzyme 08/24/2021 14  9 - 67 U/L Final     Lysozyme, Serum 08/24/2021 0.73  <=2.75 ug/mL Final     Treponema Antibody Total 08/24/2021 Nonreactive  Nonreactive Final     Quantiferon Nil Tube 08/24/2021 0.13  IU/mL Final     Quantiferon TB1 Tube 08/24/2021 1.20  IU/mL Final     Quantiferon TB2 Tube 08/24/2021 1.01   Final     Quantiferon Mitogen 08/24/2021 10.00  IU/mL Final     Quantiferon-TB Gold Plus 08/24/2021 Positive* Negative Final    Interferon gamma response to M.tuberculosis antigens was detected,suggesting infection with M.tuberculosis. Positive results in patients at low risk for infection should be interpreted with caution and repeat testing on a new sample should be considered as recommended by the 2017 ATS/IDSA/CDC Clinical Prac  kristen Guidelines for Diagnosis of Tuberculosis in Adults and Children      TB1 Ag minus Nil Value 08/24/2021 1.07  IU/mL Final     TB2 Ag minus Nil Value 08/24/2021 0.88  IU/mL Final     Mitogen minus Nil Result 08/24/2021 9.87  IU/mL Final     Nil Result 08/24/2021 0.13  IU/mL Final

## 2021-11-02 ENCOUNTER — TELEPHONE (OUTPATIENT)
Dept: INFECTIOUS DISEASES | Facility: CLINIC | Age: 46
End: 2021-11-02

## 2021-11-03 ENCOUNTER — APPOINTMENT (OUTPATIENT)
Dept: INTERPRETER SERVICES | Facility: CLINIC | Age: 46
End: 2021-11-03
Payer: COMMERCIAL

## 2021-11-03 ENCOUNTER — TELEPHONE (OUTPATIENT)
Dept: INFECTIOUS DISEASES | Facility: CLINIC | Age: 46
End: 2021-11-03

## 2021-11-03 NOTE — TELEPHONE ENCOUNTER
----- Message from Lesly Harris MD sent at 11/1/2021 10:25 AM CDT -----  Regarding: needs follow-up appt  This patient was supposed to have a follow-up visit with me in clinic but I don't see that one was scheduled yet. Was she called? She will need an .   Thanks,  Lesly

## 2021-11-11 ENCOUNTER — ALLIED HEALTH/NURSE VISIT (OUTPATIENT)
Dept: INFECTIOUS DISEASES | Facility: CLINIC | Age: 46
End: 2021-11-11
Payer: COMMERCIAL

## 2021-11-11 ENCOUNTER — LAB (OUTPATIENT)
Dept: LAB | Facility: CLINIC | Age: 46
End: 2021-11-11
Attending: INTERNAL MEDICINE

## 2021-11-11 ENCOUNTER — APPOINTMENT (OUTPATIENT)
Dept: LAB | Facility: CLINIC | Age: 46
End: 2021-11-11
Payer: COMMERCIAL

## 2021-11-11 VITALS — WEIGHT: 147.3 LBS

## 2021-11-11 DIAGNOSIS — H20.9 ANTERIOR UVEITIS: ICD-10-CM

## 2021-11-11 DIAGNOSIS — R76.12 POSITIVE QUANTIFERON-TB GOLD TEST: ICD-10-CM

## 2021-11-11 DIAGNOSIS — R76.12 POSITIVE QUANTIFERON-TB GOLD TEST: Primary | ICD-10-CM

## 2021-11-11 LAB
ALBUMIN SERPL-MCNC: 3.3 G/DL (ref 3.4–5)
ALP SERPL-CCNC: 47 U/L (ref 40–150)
ALT SERPL W P-5'-P-CCNC: 15 U/L (ref 0–50)
ANION GAP SERPL CALCULATED.3IONS-SCNC: 5 MMOL/L (ref 3–14)
AST SERPL W P-5'-P-CCNC: 14 U/L (ref 0–45)
BASOPHILS # BLD AUTO: 0 10E3/UL (ref 0–0.2)
BASOPHILS NFR BLD AUTO: 1 %
BILIRUB SERPL-MCNC: 0.3 MG/DL (ref 0.2–1.3)
BUN SERPL-MCNC: 6 MG/DL (ref 7–30)
CALCIUM SERPL-MCNC: 8.8 MG/DL (ref 8.5–10.1)
CHLORIDE BLD-SCNC: 110 MMOL/L (ref 94–109)
CO2 SERPL-SCNC: 23 MMOL/L (ref 20–32)
CREAT SERPL-MCNC: 0.48 MG/DL (ref 0.52–1.04)
CRP SERPL-MCNC: <2.9 MG/L (ref 0–8)
EOSINOPHIL # BLD AUTO: 0.1 10E3/UL (ref 0–0.7)
EOSINOPHIL NFR BLD AUTO: 2 %
ERYTHROCYTE [DISTWIDTH] IN BLOOD BY AUTOMATED COUNT: 13.5 % (ref 10–15)
ERYTHROCYTE [SEDIMENTATION RATE] IN BLOOD BY WESTERGREN METHOD: 52 MM/HR (ref 0–20)
GFR SERPL CREATININE-BSD FRML MDRD: >90 ML/MIN/1.73M2
GLUCOSE BLD-MCNC: 87 MG/DL (ref 70–99)
HBV SURFACE AB SERPL IA-ACNC: >1000 M[IU]/ML
HBV SURFACE AG SERPL QL IA: NONREACTIVE
HCT VFR BLD AUTO: 33.6 % (ref 35–47)
HGB BLD-MCNC: 10.4 G/DL (ref 11.7–15.7)
HIV 1+2 AB+HIV1 P24 AG SERPL QL IA: NONREACTIVE
IMM GRANULOCYTES # BLD: 0 10E3/UL
IMM GRANULOCYTES NFR BLD: 0 %
LYMPHOCYTES # BLD AUTO: 1.8 10E3/UL (ref 0.8–5.3)
LYMPHOCYTES NFR BLD AUTO: 37 %
MCH RBC QN AUTO: 26.7 PG (ref 26.5–33)
MCHC RBC AUTO-ENTMCNC: 31 G/DL (ref 31.5–36.5)
MCV RBC AUTO: 86 FL (ref 78–100)
MONOCYTES # BLD AUTO: 0.4 10E3/UL (ref 0–1.3)
MONOCYTES NFR BLD AUTO: 8 %
NEUTROPHILS # BLD AUTO: 2.5 10E3/UL (ref 1.6–8.3)
NEUTROPHILS NFR BLD AUTO: 52 %
NRBC # BLD AUTO: 0 10E3/UL
NRBC BLD AUTO-RTO: 0 /100
PLATELET # BLD AUTO: 380 10E3/UL (ref 150–450)
POTASSIUM BLD-SCNC: 3.7 MMOL/L (ref 3.4–5.3)
PROT SERPL-MCNC: 7.9 G/DL (ref 6.8–8.8)
RBC # BLD AUTO: 3.9 10E6/UL (ref 3.8–5.2)
SODIUM SERPL-SCNC: 138 MMOL/L (ref 133–144)
WBC # BLD AUTO: 4.7 10E3/UL (ref 4–11)

## 2021-11-11 PROCEDURE — 87340 HEPATITIS B SURFACE AG IA: CPT | Mod: 90 | Performed by: PATHOLOGY

## 2021-11-11 PROCEDURE — 86704 HEP B CORE ANTIBODY TOTAL: CPT | Mod: 90 | Performed by: PATHOLOGY

## 2021-11-11 PROCEDURE — 85025 COMPLETE CBC W/AUTO DIFF WBC: CPT | Performed by: PATHOLOGY

## 2021-11-11 PROCEDURE — 86803 HEPATITIS C AB TEST: CPT | Mod: 90 | Performed by: PATHOLOGY

## 2021-11-11 PROCEDURE — 85652 RBC SED RATE AUTOMATED: CPT | Performed by: PATHOLOGY

## 2021-11-11 PROCEDURE — 36415 COLL VENOUS BLD VENIPUNCTURE: CPT | Performed by: PATHOLOGY

## 2021-11-11 PROCEDURE — 86140 C-REACTIVE PROTEIN: CPT | Mod: 90 | Performed by: PATHOLOGY

## 2021-11-11 PROCEDURE — 80053 COMPREHEN METABOLIC PANEL: CPT | Mod: 90 | Performed by: PATHOLOGY

## 2021-11-11 PROCEDURE — 86706 HEP B SURFACE ANTIBODY: CPT | Mod: 90 | Performed by: PATHOLOGY

## 2021-11-11 PROCEDURE — 99000 SPECIMEN HANDLING OFFICE-LAB: CPT | Performed by: PATHOLOGY

## 2021-11-11 PROCEDURE — 87389 HIV-1 AG W/HIV-1&-2 AB AG IA: CPT | Mod: 90 | Performed by: PATHOLOGY

## 2021-11-11 NOTE — NURSING NOTE
appointment per Dr. Harris for weight check. Weight today is 147.3 lbs and documented under vitals.    Pt reminded to go to lab, she verbally understood and agreed.    Serina Moctezuma CMA  11/11/2021 9:03 AM

## 2021-11-12 ENCOUNTER — OFFICE VISIT (OUTPATIENT)
Dept: OPHTHALMOLOGY | Facility: CLINIC | Age: 46
End: 2021-11-12
Attending: OPHTHALMOLOGY
Payer: COMMERCIAL

## 2021-11-12 DIAGNOSIS — R76.12 POSITIVE QUANTIFERON-TB GOLD TEST: ICD-10-CM

## 2021-11-12 DIAGNOSIS — H40.003 GLAUCOMA SUSPECT OF BOTH EYES: ICD-10-CM

## 2021-11-12 DIAGNOSIS — H20.00 ACUTE ANTERIOR UVEITIS OF RIGHT EYE: Primary | ICD-10-CM

## 2021-11-12 LAB
HBV CORE AB SERPL QL IA: REACTIVE
HBV SURFACE AB SERPL IA-ACNC: >1000 M[IU]/ML
HCV AB SERPL QL IA: REACTIVE

## 2021-11-12 PROCEDURE — G0463 HOSPITAL OUTPT CLINIC VISIT: HCPCS

## 2021-11-12 PROCEDURE — 99213 OFFICE O/P EST LOW 20 MIN: CPT | Performed by: OPHTHALMOLOGY

## 2021-11-12 RX ORDER — PREDNISOLONE ACETATE 10 MG/ML
SUSPENSION/ DROPS OPHTHALMIC
Qty: 5 ML | Refills: 3 | Status: SHIPPED | OUTPATIENT
Start: 2021-11-12 | End: 2021-12-21

## 2021-11-12 ASSESSMENT — VISUAL ACUITY
METHOD: SNELLEN - LINEAR
OS_PH_SC: 20/25
OS_PH_SC+: -2
OD_PH_SC: 20/30
OS_SC: 20/50
OS_SC+: +2
OD_SC: 20/40

## 2021-11-12 ASSESSMENT — SLIT LAMP EXAM - LIDS
COMMENTS: NORMAL
COMMENTS: NORMAL

## 2021-11-12 ASSESSMENT — TONOMETRY
IOP_METHOD: TONOPEN
OD_IOP_MMHG: 16
OS_IOP_MMHG: 18

## 2021-11-12 ASSESSMENT — CONF VISUAL FIELD
OS_NORMAL: 1
OD_NORMAL: 1
METHOD: COUNTING FINGERS

## 2021-11-12 ASSESSMENT — EXTERNAL EXAM - RIGHT EYE: OD_EXAM: NORMAL

## 2021-11-12 ASSESSMENT — EXTERNAL EXAM - LEFT EYE: OS_EXAM: NORMAL

## 2021-11-12 ASSESSMENT — CUP TO DISC RATIO
OS_RATIO: 0.7
OD_RATIO: 0.7

## 2021-11-12 NOTE — LETTER
11/12/2021       RE: Sarah Andrade  [address]  [place] [facility] [address]     Dear Colleague,    Thank you for referring your patient, Sarah Andrade, to the [facility] EYE CL[facility] at [facility]. Please see a copy of my visit note below.    Chief Complaint/Presenting Concern:  Uveitis follow up    Interval History of Present Ocular Illness:  Sarah Andrade is a 46 year old patient who returns for follow up of her acute anterior uveitis of the right eye. At last visit, the inflammation was improving but not yet active. We recommended continued prednisolone 4x/day right eye and ID visit.    Overall, Ms. Andrade reports things are doing well .    Interval Updates to Medical/Family/Social History:    Ms. Andrade had a visit with Dr. Harris in Infectious Disease. We discussed that this uveitis was unlikely related to TB, so Dr. Harris recommended some blood tests and will contact Ms. Andrade about whether to possibly start TB medications.    Relevant Review of Systems Updates:  No coughs/colds, no abdominal pain.     Laboratory Testing (Reviewed from yesterday):   CBC with mild anemia otherwise WNL, CMP WNL, ESR Elevated at 52. Hep B core antibody negative, Hep B Surface Antigen negative,HIV negative,  Hep B Surface antibody positive, Hep C Antibody positive     Current eye related medications: Prednisolone 4x/day right eye     Retina/Uveitis Imaging: None today        Assessment:     1. Acute anterior uveitis of right eye  Nearly resolved.    2. Glaucoma suspect of both eyes  With normal eye pressure    3. Positive QuantiFERON-TB Gold test  Awaiting plans for possible treatment    Plan/Recommendations:      Discussed findings with patient and her aunt who translated in Kiswahili.The uveitis is nearly resolved, so we will taper drops more.     Eye pressure is normal in each eye     Additional lab testing: None at this time    Reduce steroid drop in right eye  to 3x/day until 11/30/21, then 1 drop 2x/day until next visit in the right eye. No drops left eye     No dilating drops or other medicines needed    RTC    1. ID Follow up with Dr. Harris on 11/23. No specific indications for 4 drug TB therapy unless she feels necessary    2. [place] mid-late Dec on a Friday, tonopen, no dilation, no testing    Physician Attestation     Attending Physician Attestation:  Complete documentation of historical and exam elements from today's encounter can be found in the full encounter summary report (not reduplicated in this progress note). I personally obtained the chief complaint(s) and history of present illness. I confirmed and edited as necessary the review of systems, past medical/surgical history, family history, social history, and examination findings as documented by others; and I examined the patient myself. I personally reviewed the relevant tests, images, and reports as documented above. I formulated and edited as necessary the assessment and plan and discussed the findings and management plan with the patient and family members present at the time of this visit.  Dontae Cabello M.D., Uveitis and Medical Retina, November 12, 2021     Sincerely,    Dontae Cabello MD  [facility] Dept of Ophthalmology  Uveitis and Medical Retina

## 2021-11-12 NOTE — PATIENT INSTRUCTIONS
Reduce steroid drop to 3x/day in the right eye until 11/30/21, then   1 drop 2x/day until next visit

## 2021-11-12 NOTE — NURSING NOTE
Chief Complaints and History of Present Illnesses   Patient presents with     Uveitis Follow-Up     Chief Complaint(s) and History of Present Illness(es)     Uveitis Follow-Up     Laterality: both eyes    Onset: gradual    Onset: months ago    Quality: States the va is good      Severity: moderate    Frequency: intermittently    Associated symptoms: Negative for floaters, flashes and photophobia    Pain scale: 0/10              Comments     Here for Acute anterior uveitis of right eye   Prednisolone QID right eye   Sloane Douglass COT 8:01 AM November 12, 2021

## 2021-11-12 NOTE — PROGRESS NOTES
Chief Complaint/Presenting Concern:  Uveitis follow up    Interval History of Present Ocular Illness:  Sarah Andrade is a 46 year old patient who returns for follow up of her acute anterior uveitis of the right eye. At last visit, the inflammation was improving but not yet active. We recommended continued prednisolone 4x/day right eye and ID visit.    Overall, Ms. Andrade reports things are doing well .    Interval Updates to Medical/Family/Social History:    Ms. Andrade had a visit with Dr. Harris in Infectious Disease. We discussed that this uveitis was unlikely related to TB, so Dr. Harris recommended some blood tests and will contact Ms. Andrade about whether to possibly start TB medications.    Relevant Review of Systems Updates:  No coughs/colds, no abdominal pain.     Laboratory Testing (Reviewed from yesterday):   CBC with mild anemia otherwise WNL, CMP WNL, ESR Elevated at 52. Hep B core antibody negative, Hep B Surface Antigen negative,HIV negative,  Hep B Surface antibody positive, Hep C Antibody positive     Current eye related medications: Prednisolone 4x/day right eye     Retina/Uveitis Imaging: None today    Assessment:     1. Acute anterior uveitis of right eye  Nearly resolved.    2. Glaucoma suspect of both eyes  With normal eye pressure    3. Positive QuantiFERON-TB Gold test  Awaiting plans for possible treatment    Plan/Recommendations:      Discussed findings with patient and her Aunt who translated in English.The uveitis is nearly resolved, so we will taper drops more.     Eye pressure is normal in each eye     Additional lab testing: None at this time    Reduce steroid drop in right eye to 3x/day until 11/30/21, then 1 drop 2x/day until next visit in the right eye. No drops left eye     No dilating drops or other medicines needed    RTC    1. ID Follow up with Dr. Harris on 11/23. No specific indications for 4 drug TB therapy unless she feels necessary    2. [place] mid-late Dec on a Friday,  tonopen, no dilation, no testing    Physician Attestation     Attending Physician Attestation:  Complete documentation of historical and exam elements from today's encounter can be found in the full encounter summary report (not reduplicated in this progress note). I personally obtained the chief complaint(s) and history of present illness. I confirmed and edited as necessary the review of systems, past medical/surgical history, family history, social history, and examination findings as documented by others; and I examined the patient myself. I personally reviewed the relevant tests, images, and reports as documented above. I formulated and edited as necessary the assessment and plan and discussed the findings and management plan with the patient and family members present at the time of this visit.  Dontae Cabello M.D., Uveitis and Medical Retina, November 12, 2021

## 2021-11-23 ENCOUNTER — VIRTUAL VISIT (OUTPATIENT)
Dept: INFECTIOUS DISEASES | Facility: CLINIC | Age: 46
End: 2021-11-23
Attending: INTERNAL MEDICINE
Payer: COMMERCIAL

## 2021-11-23 DIAGNOSIS — H20.9 ANTERIOR UVEITIS: ICD-10-CM

## 2021-11-23 DIAGNOSIS — R76.8 HEPATITIS C ANTIBODY POSITIVE IN BLOOD: ICD-10-CM

## 2021-11-23 DIAGNOSIS — R76.12 POSITIVE QUANTIFERON-TB GOLD TEST: Primary | ICD-10-CM

## 2021-11-23 PROBLEM — B18.2 HEPATITIS C, CHRONIC (H): Status: ACTIVE | Noted: 2021-11-23

## 2021-11-23 PROCEDURE — G0463 HOSPITAL OUTPT CLINIC VISIT: HCPCS | Mod: PN,RTG | Performed by: INTERNAL MEDICINE

## 2021-11-23 PROCEDURE — 99215 OFFICE O/P EST HI 40 MIN: CPT | Mod: 95 | Performed by: INTERNAL MEDICINE

## 2021-11-23 RX ORDER — ISONIAZID 300 MG/1
300 TABLET ORAL DAILY
Qty: 30 TABLET | Refills: 8 | Status: SHIPPED | OUTPATIENT
Start: 2021-11-23

## 2021-11-23 NOTE — PROGRESS NOTES
Left voicemail for patient to call back to set up telemedicine visit, will call again before appointment time.  Arina PrinceDEBBIE simental on 11/23/2021 at 8:08 AM    Left vm Arina LincolnDEBBIE on 11/23/2021 at 8:28 AM    Sarah is a 46 year old who is being evaluated via a billable video visit.    The patient could no connect with a video visit. This needed to converted to a phone visit.Also I needed to call an  to connect the phone call and to interpret for the telephone visit.     Subjective   Sarah is a 46 year old who presents for the following health issues latent TB.  Accompanied by an .    HPI     Patient had a follow-up visit with the ophthalmologist Dr. Cabello. He does not feel that 4 drug TB therapy is needed based on the eye exam alone. The patient has no acute complaints today. She is using the steroid eye drops as prescribed.     Review of Systems   CONSTITUTIONAL: NEGATIVE for fever, chills, change in weight  ENT/MOUTH: NEGATIVE for ear, mouth and throat problems  RESP: NEGATIVE for significant cough or SOB  CV: NEGATIVE for chest pain, palpitations or peripheral edema      Objective           Vitals:  No vitals were obtained today due to virtual visit.    Physical Exam   GENERAL: Healthy, alert and no distress  RESP: No audible wheeze, cough.  NEURO:Mentation and speech appropriate for age.  PSYCH: Mentation appears normal, affect normal/bright, judgement and insight intact, normal speech.    Lab on 11/11/2021   Component Date Value Ref Range Status     Sodium 11/11/2021 138  133 - 144 mmol/L Final    This is a corrected result. Previous result was 142 mmol/L on 11/11/2021 at 11:33 AM CST     Potassium 11/11/2021 3.7  3.4 - 5.3 mmol/L Final     Chloride 11/11/2021 110* 94 - 109 mmol/L Final     Carbon Dioxide (CO2) 11/11/2021 23  20 - 32 mmol/L Final     Anion Gap 11/11/2021 5  3 - 14 mmol/L Final     Urea Nitrogen 11/11/2021 6* 7 - 30 mg/dL Final     Creatinine 11/11/2021 0.48* 0.52 -  1.04 mg/dL Final     Calcium 11/11/2021 8.8  8.5 - 10.1 mg/dL Final     Glucose 11/11/2021 87  70 - 99 mg/dL Final     Alkaline Phosphatase 11/11/2021 47  40 - 150 U/L Final     AST 11/11/2021 14  0 - 45 U/L Final     ALT 11/11/2021 15  0 - 50 U/L Final     Protein Total 11/11/2021 7.9  6.8 - 8.8 g/dL Final     Albumin 11/11/2021 3.3* 3.4 - 5.0 g/dL Final     Bilirubin Total 11/11/2021 0.3  0.2 - 1.3 mg/dL Final     GFR Estimate 11/11/2021 >90  >60 mL/min/1.73m2 Final    As of July 11, 2021, eGFR is calculated by the CKD-EPI creatinine equation, without race adjustment. eGFR can be influenced by muscle mass, exercise, and diet. The reported eGFR is an estimation only and is only applicable if the renal function is stable.     Erythrocyte Sedimentation Rate 11/11/2021 52* 0 - 20 mm/hr Final     CRP Inflammation 11/11/2021 <2.9  0.0 - 8.0 mg/L Final     HIV Antigen Antibody Combo 11/11/2021 Nonreactive  Nonreactive Final    HIV-1 p24 Ag & HIV-1/HIV-2 Ab Not Detected     Hepatitis C Antibody 11/11/2021 Reactive* Nonreactive Final    A reactive result indicates one of the following   1) Current HCV infection   2) Past HCV infection that has resolved or   3) False positivity.     The CDC recommends that a reactive result should be followed by Nucleic acid testing for HCV RNA. If HCV RNA is detected, that indicates current HCV infection.   If HCV RNA is not detected, that indicates either past, resolved HCV infection, or false HCV antibody positivity.     Hepatitis B Surface Antigen 11/11/2021 Nonreactive  Nonreactive Final     Hepatitis B Surface Antibody 11/11/2021 >1,000.00* <8.00 m[IU]/mL Final    Reactive, Patient is considered to be immune to infection with hepatitis B when the value is greater than or equal to 12.0 mIU/mL.     Hepatitis B Core Antibody Total 11/11/2021 Reactive* Nonreactive Final    A reactive result indicates acute, chronic or past/resolved hepatitis B infection.     WBC Count 11/11/2021 4.7  4.0  - 11.0 10e3/uL Final     RBC Count 11/11/2021 3.90  3.80 - 5.20 10e6/uL Final     Hemoglobin 11/11/2021 10.4* 11.7 - 15.7 g/dL Final     Hematocrit 11/11/2021 33.6* 35.0 - 47.0 % Final     MCV 11/11/2021 86  78 - 100 fL Final     MCH 11/11/2021 26.7  26.5 - 33.0 pg Final     MCHC 11/11/2021 31.0* 31.5 - 36.5 g/dL Final     RDW 11/11/2021 13.5  10.0 - 15.0 % Final     Platelet Count 11/11/2021 380  150 - 450 10e3/uL Final     % Neutrophils 11/11/2021 52  % Final     % Lymphocytes 11/11/2021 37  % Final     % Monocytes 11/11/2021 8  % Final     % Eosinophils 11/11/2021 2  % Final     % Basophils 11/11/2021 1  % Final     % Immature Granulocytes 11/11/2021 0  % Final     NRBCs per 100 WBC 11/11/2021 0  <1 /100 Final     Absolute Neutrophils 11/11/2021 2.5  1.6 - 8.3 10e3/uL Final     Absolute Lymphocytes 11/11/2021 1.8  0.8 - 5.3 10e3/uL Final     Absolute Monocytes 11/11/2021 0.4  0.0 - 1.3 10e3/uL Final     Absolute Eosinophils 11/11/2021 0.1  0.0 - 0.7 10e3/uL Final     Absolute Basophils 11/11/2021 0.0  0.0 - 0.2 10e3/uL Final     Absolute Immature Granulocytes 11/11/2021 0.0  <=0.0 10e3/uL Final     Absolute NRBCs 11/11/2021 0.0  10e3/uL Final     Hepatitis B Surface Antibody 11/11/2021 >1,000.00* <8.00 m[IU]/mL Final    Reactive, Patient is considered to be immune to infection with hepatitis B when the value is greater than or equal to 12.0 mIU/mL.       Diagnoses and all orders for this visit:    Positive QuantiFERON-TB Gold test  -     isoniazid (NYDRAZID) 300 MG tablet; Take 1 tablet (300 mg) by mouth daily  -     Hepatic panel; Future  -     Erythrocyte sedimentation rate auto; Future    Anterior uveitis  -     isoniazid (NYDRAZID) 300 MG tablet; Take 1 tablet (300 mg) by mouth daily    Hepatitis C antibody positive in blood  -     Hepatitis C RNA quantitative; Future      Will begin treatment for latent TB now with  mg daily x 9 months.   Plan recheck LFTs, ESR and Hep C RNA in one month and arrange  follow-up visit with me to review labs with patient and see how she is tolerating the INH.    Video-Visit Details    Type of service:  Video Visit changed to telephone visit. Start Time 8:30 am    Telephone End Time: 9:15 am    Originating Location (pt. Location): Home    Distant Location (provider location):  [facility] INFECT[facility] DISE[facility] CLINIC [place]     Platform used for Video Visit: Other: Telephone after Amwell failed.

## 2021-11-23 NOTE — LETTER
11/23/2021       RE: Sarah Andrade  [address]  [place] [facility] [place] [address]     Dear Colleague,    Thank you for referring your patient, Sarah Andrade, to the [facility] [place] [facility]. Please see a copy of my visit note below.    Left voicemail for patient to call back to set up telemedicine visit, will call again before appointment time.  Arina Lincoln CMA on 11/23/2021 at 8:08 AM    Left vm Arina Lincoln CMA on 11/23/2021 at 8:28 AM    Sarah is a 46 year old who is being evaluated via a billable video visit.    The patient could no connect with a video visit. This needed to converted to a phone visit.Also I needed to call an  to connect the phone call and to interpret for the telephone visit.     Subjective   Sarah is a 46 year old who presents for the following health issues latent TB.  Accompanied by an .    HPI     Patient had a follow-up visit with the ophthalmologist Dr. Cabello. He does not feel that 4 drug TB therapy is needed based on the eye exam alone. The patient has no acute complaints today. She is using the steroid eye drops as prescribed.     Review of Systems   CONSTITUTIONAL: NEGATIVE for fever, chills, change in weight  ENT/MOUTH: NEGATIVE for ear, mouth and throat problems  RESP: NEGATIVE for significant cough or SOB  CV: NEGATIVE for chest pain, palpitations or peripheral edema      Objective           Vitals:  No vitals were obtained today due to virtual visit.    Physical Exam   GENERAL: Healthy, alert and no distress  RESP: No audible wheeze, cough.  NEURO:Mentation and speech appropriate for age.  PSYCH: Mentation appears normal, affect normal/bright, judgement and insight intact, normal speech.    Lab on 11/11/2021   Component Date Value Ref Range Status     Sodium 11/11/2021 138  133 - 144 mmol/L Final    This is a corrected result. Previous result was 142  mmol/L on 11/11/2021 at 11:33 AM CST     Potassium 11/11/2021 3.7  3.4 - 5.3 mmol/L Final     Chloride 11/11/2021 110* 94 - 109 mmol/L Final     Carbon Dioxide (CO2) 11/11/2021 23  20 - 32 mmol/L Final     Anion Gap 11/11/2021 5  3 - 14 mmol/L Final     Urea Nitrogen 11/11/2021 6* 7 - 30 mg/dL Final     Creatinine 11/11/2021 0.48* 0.52 - 1.04 mg/dL Final     Calcium 11/11/2021 8.8  8.5 - 10.1 mg/dL Final     Glucose 11/11/2021 87  70 - 99 mg/dL Final     Alkaline Phosphatase 11/11/2021 47  40 - 150 U/L Final     AST 11/11/2021 14  0 - 45 U/L Final     ALT 11/11/2021 15  0 - 50 U/L Final     Protein Total 11/11/2021 7.9  6.8 - 8.8 g/dL Final     Albumin 11/11/2021 3.3* 3.4 - 5.0 g/dL Final     Bilirubin Total 11/11/2021 0.3  0.2 - 1.3 mg/dL Final     GFR Estimate 11/11/2021 >90  >60 mL/min/1.73m2 Final    As of July 11, 2021, eGFR is calculated by the CKD-EPI creatinine equation, without race adjustment. eGFR can be influenced by muscle mass, exercise, and diet. The reported eGFR is an estimation only and is only applicable if the renal function is stable.     Erythrocyte Sedimentation Rate 11/11/2021 52* 0 - 20 mm/hr Final     CRP Inflammation 11/11/2021 <2.9  0.0 - 8.0 mg/L Final     HIV Antigen Antibody Combo 11/11/2021 Nonreactive  Nonreactive Final    HIV-1 p24 Ag & HIV-1/HIV-2 Ab Not Detected     Hepatitis C Antibody 11/11/2021 Reactive* Nonreactive Final    A reactive result indicates one of the following   1) Current HCV infection   2) Past HCV infection that has resolved or   3) False positivity.     The CDC recommends that a reactive result should be followed by Nucleic acid testing for HCV RNA. If HCV RNA is detected, that indicates current HCV infection.   If HCV RNA is not detected, that indicates either past, resolved HCV infection, or false HCV antibody positivity.     Hepatitis B Surface Antigen 11/11/2021 Nonreactive  Nonreactive Final     Hepatitis B Surface Antibody 11/11/2021 >1,000.00* <8.00  m[IU]/mL Final    Reactive, Patient is considered to be immune to infection with hepatitis B when the value is greater than or equal to 12.0 mIU/mL.     Hepatitis B Core Antibody Total 11/11/2021 Reactive* Nonreactive Final    A reactive result indicates acute, chronic or past/resolved hepatitis B infection.     WBC Count 11/11/2021 4.7  4.0 - 11.0 10e3/uL Final     RBC Count 11/11/2021 3.90  3.80 - 5.20 10e6/uL Final     Hemoglobin 11/11/2021 10.4* 11.7 - 15.7 g/dL Final     Hematocrit 11/11/2021 33.6* 35.0 - 47.0 % Final     MCV 11/11/2021 86  78 - 100 fL Final     MCH 11/11/2021 26.7  26.5 - 33.0 pg Final     MCHC 11/11/2021 31.0* 31.5 - 36.5 g/dL Final     RDW 11/11/2021 13.5  10.0 - 15.0 % Final     Platelet Count 11/11/2021 380  150 - 450 10e3/uL Final     % Neutrophils 11/11/2021 52  % Final     % Lymphocytes 11/11/2021 37  % Final     % Monocytes 11/11/2021 8  % Final     % Eosinophils 11/11/2021 2  % Final     % Basophils 11/11/2021 1  % Final     % Immature Granulocytes 11/11/2021 0  % Final     NRBCs per 100 WBC 11/11/2021 0  <1 /100 Final     Absolute Neutrophils 11/11/2021 2.5  1.6 - 8.3 10e3/uL Final     Absolute Lymphocytes 11/11/2021 1.8  0.8 - 5.3 10e3/uL Final     Absolute Monocytes 11/11/2021 0.4  0.0 - 1.3 10e3/uL Final     Absolute Eosinophils 11/11/2021 0.1  0.0 - 0.7 10e3/uL Final     Absolute Basophils 11/11/2021 0.0  0.0 - 0.2 10e3/uL Final     Absolute Immature Granulocytes 11/11/2021 0.0  <=0.0 10e3/uL Final     Absolute NRBCs 11/11/2021 0.0  10e3/uL Final     Hepatitis B Surface Antibody 11/11/2021 >1,000.00* <8.00 m[IU]/mL Final    Reactive, Patient is considered to be immune to infection with hepatitis B when the value is greater than or equal to 12.0 mIU/mL.       Diagnoses and all orders for this visit:    Positive QuantiFERON-TB Gold test  -     isoniazid (NYDRAZID) 300 MG tablet; Take 1 tablet (300 mg) by mouth daily  -     Hepatic panel; Future  -     Erythrocyte sedimentation rate  auto; Future    Anterior uveitis  -     isoniazid (NYDRAZID) 300 MG tablet; Take 1 tablet (300 mg) by mouth daily    Hepatitis C antibody positive in blood  -     Hepatitis C RNA quantitative; Future      Will begin treatment for latent TB now with  mg daily x 9 months.   Plan recheck LFTs, ESR and Hep C RNA in one month and arrange follow-up visit with me to review labs with patient and see how she is tolerating the INH.    Video-Visit Details    Type of service:  Video Visit changed to telephone visit. Start Time 8:30 am    Telephone End Time: 9:15 am    Originating Location (pt. Location): Home    Distant Location (provider location):  [facility] INFECTIOUS DISEASE CLINIC [place]     Platform used for Video Visit: Other: Telephone after Amwell failed.     Again, thank you for allowing me to participate in the care of your patient.      Sincerely,    Lesly Harris MD

## 2021-11-23 NOTE — PATIENT INSTRUCTIONS
Start Isoniazid one tablet by mouth daily.  Check blood tests in one month.  Virtual visit on Jan. 5th.

## 2021-12-17 ENCOUNTER — APPOINTMENT (OUTPATIENT)
Dept: INTERPRETER SERVICES | Facility: CLINIC | Age: 46
End: 2021-12-17
Payer: COMMERCIAL

## 2021-12-21 ENCOUNTER — OFFICE VISIT (OUTPATIENT)
Dept: OPHTHALMOLOGY | Facility: CLINIC | Age: 46
End: 2021-12-21
Attending: OPHTHALMOLOGY
Payer: COMMERCIAL

## 2021-12-21 DIAGNOSIS — H40.003 GLAUCOMA SUSPECT OF BOTH EYES: ICD-10-CM

## 2021-12-21 DIAGNOSIS — R76.12 POSITIVE QUANTIFERON-TB GOLD TEST: ICD-10-CM

## 2021-12-21 DIAGNOSIS — H20.00 ACUTE ANTERIOR UVEITIS OF RIGHT EYE: Primary | ICD-10-CM

## 2021-12-21 PROCEDURE — G0463 HOSPITAL OUTPT CLINIC VISIT: HCPCS

## 2021-12-21 PROCEDURE — 99214 OFFICE O/P EST MOD 30 MIN: CPT | Performed by: OPHTHALMOLOGY

## 2021-12-21 RX ORDER — PREDNISOLONE ACETATE 10 MG/ML
SUSPENSION/ DROPS OPHTHALMIC
Qty: 5 ML | Refills: 3 | Status: SHIPPED | OUTPATIENT
Start: 2021-12-21 | End: 2022-02-18

## 2021-12-21 ASSESSMENT — TONOMETRY
OD_IOP_MMHG: 16
OS_IOP_MMHG: 16
IOP_METHOD: TONOPEN

## 2021-12-21 ASSESSMENT — CONF VISUAL FIELD
METHOD: COUNTING FINGERS
OD_NORMAL: 1
OS_NORMAL: 1

## 2021-12-21 ASSESSMENT — EXTERNAL EXAM - LEFT EYE: OS_EXAM: NORMAL

## 2021-12-21 ASSESSMENT — VISUAL ACUITY
OS_PH_SC: 20/25
METHOD: SNELLEN - LINEAR
OD_SC+: +2
OD_PH_SC: 20/25
OS_SC: 20/40
OD_SC: 20/50

## 2021-12-21 ASSESSMENT — SLIT LAMP EXAM - LIDS
COMMENTS: NORMAL
COMMENTS: NORMAL

## 2021-12-21 ASSESSMENT — CUP TO DISC RATIO
OS_RATIO: 0.7
OD_RATIO: 0.7

## 2021-12-21 ASSESSMENT — EXTERNAL EXAM - RIGHT EYE: OD_EXAM: NORMAL

## 2021-12-21 NOTE — PROGRESS NOTES
Chief Complaint/Presenting Concern: Uveitis follow-up    Interval History of Present Ocular Illness:  Sarah Andrade is a 46 year old patient who returns for follow up of her acute anterior uveitis of the right eye.  She was last seen on November 12, 2021 at which time the uveitis in the right eye had nearly resolved so he recommended tapering the steroid eyedrops.  We discussed the upcoming visit with Dr. Harris in Infectious disease and to return for follow-up today.    In the interim, she reports the eyes are doing well.     Interval Updates to Medical/Family/Social History:    Ms. Andrade saw Dr. Lesly Harris in Infectious disease on November 23, 2021.  Given that the uveitis in the right eye was improving Dr. Harris and I had previously communicated that the uveitis was not likely related to tuberculosis and supported prophylactic therapy for latent TB. Ms. Andrade and Dr. Harris agreed on 9 months of isoniazid monotherapy. Ms. Andrade reports she did not get a call from the pharmacy, so this has not been started.    Relevant Review of Systems Updates:  No coughs, no headaches.     Laboratory Testing (reviewed from November 11, 2021): CMP within normal limits, CBC with mild anemia otherwise WNL     Current eye related medications:, Prednisolone 2x/day right eye only    Retina/Uveitis Imaging: None today    Assessment:     1. Acute anterior uveitis of right eye  No symptoms, but few more cells present in each eye     2. Glaucoma suspect of both eyes  With normal eye pressure    3. Positive QuantiFERON-TB Gold test  Not yet started Isoniazid 300 mg daily with Dr. Harris, as not available from pharmacy    Plan/Recommendations:      Discussed findings with patient and her Aunt, who helped to translate in Vietnamese. The uveitis is without symptoms but a few more cells are present in each eye. Given that the inflammation was better controlled on drops 4x/day last visit and slightly worse today on 2x/day, we agreed to increase  to 3x/day and maintain at this frequency. We discussed that the pill from Dr. Harris may also help the uveitis.    Eye pressure is normal in each eye     No other lab testing recommended    Continue the steroid drop in the right eye, but increase to 3x/day and continue this until our next visit    Start Isoniazid 300 mg daily. This should be ready at the [facility]. Dr. Harris would like you to take this medicine for 9 months total.    RTC Early February 2022 tonopen, refract, no dilation but with RNFL (ordered)    Physician Attestation     Attending Physician Attestation:  Complete documentation of historical and exam elements from today's encounter can be found in the full encounter summary report (not reduplicated in this progress note). I personally obtained the chief complaint(s) and history of present illness. I confirmed and edited as necessary the review of systems, past medical/surgical history, family history, social history, and examination findings as documented by others; and I examined the patient myself. I personally reviewed the relevant tests, images, and reports as documented above. I formulated and edited as necessary the assessment and plan and discussed the findings and management plan with the patient and family members present at the time of this visit.  Dontae Cabello M.D., Uveitis and Medical Retina, December 21, 2021

## 2021-12-21 NOTE — PATIENT INSTRUCTIONS
Continue the steroid drop in the right eye, but increase to 3x/day and continue this until our next visit without changing      Start Isoniazid 300 mg daily. This should be ready at the [facility]. Dr. Harris would like you to take this medicine for 9 months total.

## 2021-12-21 NOTE — NURSING NOTE
Chief Complaints and History of Present Illnesses   Patient presents with     Uveitis Follow-Up     Chief Complaint(s) and History of Present Illness(es)     Uveitis Follow-Up     Laterality: right eye    Onset: gradual    Onset: months ago    Quality: States the va is good      Severity: moderate    Frequency: intermittently    Associated symptoms: photophobia (has a little).  Negative for floaters and flashes    Treatments tried: eye drops    Pain scale: 0/10              Comments     Here for Acute anterior uveitis of right eye   PF BID right eye   Sloane Douglass COT 7:34 AM December 21, 2021

## 2021-12-21 NOTE — LETTER
12/21/2021       RE: Sarah Andrade  [address]  [facility] [address]     Dear Colleague,    Thank you for referring your patient, Sarah Andrade, to the [facility] at [facility]. Please see a copy of my visit note below.    Chief Complaint/Presenting Concern: Uveitis follow-up.    Interval History of Present Ocular Illness:  Sarah Andrade is a 46 year old patient who returns for follow up of her acute anterior uveitis of the right eye.  She was last seen on November 12, 2021 at which time the uveitis in the right eye had nearly resolved so he recommended tapering the steroid eyedrops.  We discussed the upcoming visit with Dr. Harris in Infectious disease and to return for follow-up today.    In the interim, she reports the eyes are doing well.     Interval Updates to Medical/Family/Social History:    Ms. Andrade saw Dr. Lesly Harris in Infectious disease on November 23, 2021.  Given that the uveitis in the right eye was improving Dr. Harris and I had previously communicated that the uveitis was not likely related to tuberculosis and supported prophylactic therapy for latent TB. Ms. Andrade and Dr. Harris agreed on 9 months of isoniazid monotherapy. Ms. Andrade reports she did not get a call from the pharmacy, so this has not been started.    Relevant Review of Systems Updates:  No coughs, no headaches.     Laboratory Testing (reviewed from November 11, 2021): CMP within normal limits, CBC with mild anemia otherwise WNL.     Current eye related medications:, Prednisolone 2x/day right eye only.    Retina/Uveitis Imaging: None today.  Assessment:     1. Acute anterior uveitis of right eye  No symptoms, but few more cells present in each eye.     2. Glaucoma suspect of both eyes  With normal eye pressure.    3. Positive QuantiFERON-TB Gold test  Not yet started Isoniazid 300 mg daily with Dr. Harris, as not available from  pharmacy.    Plan/Recommendations:      Discussed findings with patient and her Aunt, who helped to translate in Polish. The uveitis is without symptoms but a few more cells are present in each eye. Given that the inflammation was better controlled on drops 4x/day last visit and slightly worse today on 2x/day, we agreed to increase to 3x/day and maintain at this frequency. We discussed that the pill from Dr. Harris may also help the uveitis.    Eye pressure is normal in each eye.     No other lab testing recommended.    Continue the steroid drop in the right eye, but increase to 3x/day and continue this until our next visit.    Start Isoniazid 300 mg daily. This should be ready at the [facility]. Dr. Harris would like you to take this medicine for 9 months total.    RTC Early February 2022 tonopen, refract, no dilation but with RNFL (ordered)    Physician Attestation     Attending Physician Attestation:  Complete documentation of historical and exam elements from today's encounter can be found in the full encounter summary report (not reduplicated in this progress note). I personally obtained the chief complaint(s) and history of present illness. I confirmed and edited as necessary the review of systems, past medical/surgical history, family history, social history, and examination findings as documented by others; and I examined the patient myself. I personally reviewed the relevant tests, images, and reports as documented above. I formulated and edited as necessary the assessment and plan and discussed the findings and management plan with the patient and family members present at the time of this visit.  Dontae Cabello M.D., Uveitis and Medical Retina, December 21, 2021     Again, thank you for allowing me to participate in the care of your patient.    Sincerely,    Dontae Cabello MD  [facility] Dept of Ophthalmology  Uveitis and Medical Retina

## 2022-01-05 ENCOUNTER — VIRTUAL VISIT (OUTPATIENT)
Dept: INFECTIOUS DISEASES | Facility: CLINIC | Age: 47
End: 2022-01-05
Attending: INTERNAL MEDICINE
Payer: COMMERCIAL

## 2022-01-05 DIAGNOSIS — R76.12 POSITIVE QUANTIFERON-TB GOLD TEST: Primary | ICD-10-CM

## 2022-01-05 NOTE — PROGRESS NOTES
Patient was called with . Message left with appointment time and a call back number. Radha Diaz on 1/5/2022 at 8:35 AM      Patient was called and message left. Radha Diaz on 1/5/2022 at 8:57 AM      Sarah is a 46 year old who is being evaluated via a billable telephone visit.      What phone number would you like to be contacted at? [phone]  How would you like to obtain your AVS? Mail a copy     I tried calling the  who called the patient but there was no answer. I left a message for the patient to call my clinic phone back.

## 2022-01-05 NOTE — LETTER
Date:January 20, 2022      Patient was self referred, no letter generated. Do not send.        [facility] Health Information

## 2022-01-05 NOTE — LETTER
1/5/2022       RE: Sarah Andrade  [address]  [facility] [address]     Dear Colleague,    Thank you for referring your patient, Sarah Andrade, to the [facility] INFECTI[facility] DISE[facility]IN[facility]. Please see a copy of my visit note below.    Patient was called with . Message left with appointment time and a call back number. Radha Diaz on 1/5/2022 at 8:35 AM      Patient was called and message left. Radha Diaz on 1/5/2022 at 8:57 AM      Sarah is a 46 year old who is being evaluated via a billable telephone visit.      What phone number would you like to be contacted at? [phone]  How would you like to obtain your AVS? Mail a copy     I tried calling the  who called the patient but there was no answer. I left a message for the patient to call my clinic phone back.         Again, thank you for allowing me to participate in the care of your patient.      Sincerely,    Lesly Harris MD

## 2022-02-17 PROBLEM — H40.003 GLAUCOMA SUSPECT OF BOTH EYES: Status: ACTIVE | Noted: 2021-09-13

## 2022-02-17 PROBLEM — H20.00 ACUTE ANTERIOR UVEITIS OF RIGHT EYE: Status: ACTIVE | Noted: 2021-09-13

## 2022-02-18 ENCOUNTER — OFFICE VISIT (OUTPATIENT)
Dept: OPHTHALMOLOGY | Facility: CLINIC | Age: 47
End: 2022-02-18
Attending: OPHTHALMOLOGY
Payer: COMMERCIAL

## 2022-02-18 DIAGNOSIS — R76.12 POSITIVE QUANTIFERON-TB GOLD TEST: ICD-10-CM

## 2022-02-18 DIAGNOSIS — H40.003 GLAUCOMA SUSPECT OF BOTH EYES: ICD-10-CM

## 2022-02-18 DIAGNOSIS — H20.021 RECURRENT IRITIS OF RIGHT EYE: Primary | ICD-10-CM

## 2022-02-18 PROCEDURE — 92015 DETERMINE REFRACTIVE STATE: CPT

## 2022-02-18 PROCEDURE — 92133 CPTRZD OPH DX IMG PST SGM ON: CPT | Performed by: OPHTHALMOLOGY

## 2022-02-18 PROCEDURE — G0463 HOSPITAL OUTPT CLINIC VISIT: HCPCS

## 2022-02-18 PROCEDURE — 99214 OFFICE O/P EST MOD 30 MIN: CPT | Performed by: OPHTHALMOLOGY

## 2022-02-18 RX ORDER — PREDNISOLONE ACETATE 10 MG/ML
SUSPENSION/ DROPS OPHTHALMIC
Qty: 5 ML | Refills: 3 | Status: SHIPPED | OUTPATIENT
Start: 2022-02-18 | End: 2022-04-12

## 2022-02-18 ASSESSMENT — CUP TO DISC RATIO
OD_RATIO: 0.7
OS_RATIO: 0.7

## 2022-02-18 ASSESSMENT — EXTERNAL EXAM - LEFT EYE: OS_EXAM: NORMAL

## 2022-02-18 ASSESSMENT — SLIT LAMP EXAM - LIDS
COMMENTS: NORMAL
COMMENTS: NORMAL

## 2022-02-18 ASSESSMENT — CONF VISUAL FIELD
OD_NORMAL: 1
METHOD: COUNTING FINGERS
OS_NORMAL: 1

## 2022-02-18 ASSESSMENT — VISUAL ACUITY
OD_SC: 20/30
OS_SC+: -1
OS_PH_SC: 20/30
OS_SC: 20/50
METHOD: SNELLEN - LINEAR

## 2022-02-18 ASSESSMENT — TONOMETRY
IOP_METHOD: TONOPEN
OS_IOP_MMHG: 15
OD_IOP_MMHG: 17

## 2022-02-18 ASSESSMENT — REFRACTION_MANIFEST
OD_CYLINDER: +0.75
OS_ADD: +1.25
OS_SPHERE: +1.75
OD_ADD: +1.25
OD_SPHERE: +2.00
OS_AXIS: 015
OD_AXIS: 160
OS_CYLINDER: +1.25

## 2022-02-18 ASSESSMENT — EXTERNAL EXAM - RIGHT EYE: OD_EXAM: NORMAL

## 2022-02-18 NOTE — PATIENT INSTRUCTIONS
Continue Prednisolone drop but reduce to 2x/day in the right eye until next visit. No drops needed in the left eye     We did a check for glasses today and you can get a prescription anytime.     Will reach out to Dr. Harris's staff about setting up another Virtual visit about starting Isoniazid for latent TB. There is no urgency as non-TB uveitis improving on drops

## 2022-02-18 NOTE — NURSING NOTE
Chief Complaints and History of Present Illnesses   Patient presents with     Uveitis Follow-Up     Chief Complaint(s) and History of Present Illness(es)     Uveitis Follow-Up     Laterality: right eye    Onset: gradual    Onset: months ago    Quality: States va is the same since last visit      Severity: moderate    Frequency: intermittently    Associated symptoms: photophobia (little bit).  Negative for floaters and flashes    Treatments tried: eye drops    Pain scale: 0/10              Comments     Here for Acute anterior uveitis of right eye   PF TID right eye   Sloane Douglass COT 7:50 AM February 18, 2022

## 2022-02-18 NOTE — LETTER
February 18, 2022      Re: Sarah Andrade   1975    To Whom It May Concern:    This is to confirm that the above patient was seen on 2/18/2022.  Sarah Andrade is able to return to work today. Please kindly excuse her time away from work for this necessary medical appointment and allow her to return without any restrictions. Thank you.     Thank you for your cooperation in this matter.  Please do not hesitate to contact me if you have any further questions.    Sincerely,       FRANK GAONA

## 2022-02-18 NOTE — PROGRESS NOTES
Chief Complaint/Presenting Concern:  Uveitis follow up    Interval History of Present Ocular Illness:  Sarah Andrade is a 46 year old patient who returns for follow up of her recurrent iritis of the right eye. At last visit, we discussed continuing drops 3x/day in the right eye and starting Isoniazid. Overall, right eye doing well.     Interval Updates to Medical/Family/Social History:    Was unable to attend visit with Dr. Harris, so has not started Isoniazid. Started new job.     Relevant Review of Systems Updates:  No coughs/colds.     Laboratory Testing: None since last visit    Current eye related medications: Prednisolone 3x/day in the right eye.     Retina/Uveitis Imaging:   OCT Spectralis Macula February 18, 2022  right eye: Normal contour, no fluid. Stable   left eye: Normal contour, no fluid. Stable     OCT Optic Nerve RNFL Spectralis February 18, 2022  right eye: Avg thickness 96 microns, slightly improved thickening, no thinning  left eye: Avg thickness 100 microns, stable without thinning    Assessment:     1. Recurrent iritis of right eye  Improving on regular drop use    2. Glaucoma suspect of both eyes  Normal IOP     3. Positive QuantiFERON-TB Gold test  Not yet started Isoniazid, awaiting virtual visit with Dr. Harris     Plan/Recommendations:      Discussed findings with patient and her Aunt, who translated into Slovak. The inflammation is inactive in the right eye and none present left eye. We can taper drops in the right eye.     Eye pressure is 17, 15 and optic nerve scans normal. We can observe without drops for eye pressure lowering.     Recommend additional testing: None at this time.     Continue Prednisolone drop but reduce to 2x/day in the right eye until next visit. No drops needed in the left eye     We did a check for glasses today and you can get a prescription anytime.     Will reach out to Dr. Harris's staff about setting up another Virtual visit about starting Isoniazid for latent  H&P    Assessment:   Headaches. Body mass index is 27.64 kg/(m^2). Plan:   Bilateral temporal artery biopsies. Discussed the risk of surgery including bleeding, infection, injury to adjacent structures, and the risks of general anesthetic. The patient understands the risks; any and all questions were answered to the patient's satisfaction. Subjective:      Josette Bronson is a 82 y.o. male who presents with a month of severe HA. Has responded well to steroids. HA now gone. Asked by his rheumatologist to do biopsies. Objective:       Physical Exam:  PHYSICAL EXAM:    Chest:   [x]   CTA bialterally, no wheezing/rhonchi/rales/crackles    []   wheezing     []   rhonchi     []   crackles     []   use of accessory muscles    Heart:  [x]   regular rate and rhythm     [x]   No murmurs/rubs/gallops    []   irregular rhythm     []   Murmur     []   Rubs     []   Gallops         Past Medical History:   Diagnosis Date    Arthritis     CAD (coronary artery disease)     Hard of hearing     Headache     PMR (polymyalgia rheumatica) ([facility])     Skin cancer     Sleep apnea     no longer uses cpap     Past Surgical History:   Procedure Laterality Date    CARDIAC SURG PROCEDURE UNLIST      cardiac stent    HX CAROTID STENT      VASCULAR SURGERY PROCEDURE UNLIST      carotid endartectomy      Family History   Problem Relation Age of Onset   [address] Arthritis-rheumatoid Mother     No Known Problems Father     Gout Son      Social History     Social History    Marital status:      Spouse name: N/A    Number of children: N/A    Years of education: N/A     Social History Main Topics    Smoking status: Never Smoker    Smokeless tobacco: Never Used    Alcohol use No    Drug use: No    Sexual activity: Not Asked     Other Topics Concern    None     Social History Narrative      Prior to Admission medications    Medication Sig Start Date End Date Taking?  Authorizing Provider   rOPINIRole (REQUIP) 0.25 mg tablet Take 0.25 mg by mouth nightly. Yes Historical Provider   tamsulosin (FLOMAX) 0.4 mg capsule Take 0.4 mg by mouth daily. Yes Historical Provider   pravastatin (PRAVACHOL) 40 mg tablet Take 40 mg by mouth nightly. Yes Historical Provider   levothyroxine (SYNTHROID) 100 mcg tablet Take 100 mcg by mouth Daily (before breakfast). Yes Historical Provider   ferrous sulfate (IRON) 325 mg (65 mg iron) EC tablet Take 325 mg by mouth daily. Yes Historical Provider   metoprolol tartrate (LOPRESSOR) 25 mg tablet Take 12.5 mg by mouth daily. Yes Historical Provider   lisinopril (PRINIVIL, ZESTRIL) 10 mg tablet Take 10 mg by mouth daily. Yes Historical Provider   fish oil-dha-epa 1,200-144-216 mg cap Take 1 Tab by mouth daily. Yes Historical Provider   multivitamin (ONE A DAY) tablet Take 1 Tab by mouth daily. Yes Historical Provider   aspirin delayed-release 81 mg tablet Take  by mouth daily. Yes Historical Provider   predniSONE (DELTASONE) 10 mg tablet 2 tabs daily for 3 weeks, 1.5 tabs daily for 4 weeks, 1 tab daily for 4 weeks 8/14/18   Brenda Diaz MD   tocilizumab (ACTEMRA) 162 mg/0.9 mL syrg 162 mg by SubCUTAneous route every seven (7) days. Indications: GIANT CELL ARTERITIS 7/10/18   Brenda Diaz MD     ALLERGIES:    Allergies   Allergen Reactions    Pcn [Penicillins] Swelling       Review of Systems:    See prior consult, office note or scanned list in media section. 10 systems negative except as specified.                 Signed By: Carmen Valles MD     August 18, 2018 TB. There is no urgency as non-TB uveitis improving on drops     Return for 2 months tonopen, no dilation, no testing.     Physician Attestation     Attending Physician Attestation:  Complete documentation of historical and exam elements from today's encounter can be found in the full encounter summary report (not reduplicated in this progress note). I personally obtained the chief complaint(s) and history of present illness. I confirmed and edited as necessary the review of systems, past medical/surgical history, family history, social history, and examination findings as documented by others; and I examined the patient myself. I personally reviewed the relevant tests, images, and reports as documented above. I formulated and edited as necessary the assessment and plan and discussed the findings and management plan with the patient and family members present at the time of this visit.  Dontae Cabello M.D., Uveitis and Medical Retina, February 18, 2022

## 2022-02-18 NOTE — LETTER
2/18/2022       RE: Sarah Andrade  [address]  [facility] [address]     Dear Colleague,    Thank you for referring your patient, Sarah Andrade, to the [facility] - [place] at [facility]. Please see a copy of my visit note below.    Chief Complaint/Presenting Concern:  Uveitis follow up    Interval History of Present Ocular Illness:  Sarah Andrade is a 46 year old patient who returns for follow up of her recurrent iritis of the right eye. At last visit, we discussed continuing drops 3x/day in the right eye and starting Isoniazid. Overall, right eye doing well.     Interval Updates to Medical/Family/Social History:    Was unable to attend visit with Dr. Harris, so has not started Isoniazid. Started new job.     Relevant Review of Systems Updates:  No coughs/colds.     Laboratory Testing: None since last visit    Current eye related medications: Prednisolone 3x/day in the right eye.     Retina/Uveitis Imaging:   OCT Spectralis Macula February 18, 2022  right eye: Normal contour, no fluid. Stable   left eye: Normal contour, no fluid. Stable     OCT Optic Nerve RNFL Spectralis February 18, 2022  right eye: Avg thickness 96 microns, slightly improved thickening, no thinning  left eye: Avg thickness 100 microns, stable without thinning    Assessment:     1. Recurrent iritis of right eye  Improving on regular drop use    2. Glaucoma suspect of both eyes  Normal IOP     3. Positive QuantiFERON-TB Gold test  Not yet started Isoniazid, awaiting virtual visit with Dr. Harris     Plan/Recommendations:      Discussed findings with patient and her Aunt, who translated into Indonesian. The inflammation is inactive in the right eye and none present left eye. We can taper drops in the right eye.     Eye pressure is 17, 15 and optic nerve scans normal. We can observe without drops for eye pressure lowering.     Recommend additional testing: None at  this time.     Continue Prednisolone drop but reduce to 2x/day in the right eye until next visit. No drops needed in the left eye     We did a check for glasses today and you can get a prescription anytime.     Will reach out to Dr. Harris's staff about setting up another Virtual visit about starting Isoniazid for latent TB. There is no urgency as non-TB uveitis improving on drops     Return for 2 months tonopen, no dilation, no testing.     Physician Attestation     Attending Physician Attestation:  Complete documentation of historical and exam elements from today's encounter can be found in the full encounter summary report (not reduplicated in this progress note). I personally obtained the chief complaint(s) and history of present illness. I confirmed and edited as necessary the review of systems, past medical/surgical history, family history, social history, and examination findings as documented by others; and I examined the patient myself. I personally reviewed the relevant tests, images, and reports as documented above. I formulated and edited as necessary the assessment and plan and discussed the findings and management plan with the patient and family members present at the time of this visit.  Dontae Cabello M.D., Uveitis and Medical Retina, February 18, 2022     Again, thank you for allowing me to participate in the care of your patient.      Sincerely,    Dontae Cabello MD  [facility] Dept of Ophthalmology  Uveitis and Medical Retina

## 2022-03-04 ENCOUNTER — TELEPHONE (OUTPATIENT)
Dept: INFECTIOUS DISEASES | Facility: CLINIC | Age: 47
End: 2022-03-04
Payer: COMMERCIAL

## 2022-03-04 NOTE — TELEPHONE ENCOUNTER
Called patient with  to schedule her next appointment. No answer, Left message with appointment details and asked patient to call back if this will not work. Will try to reach patient again next week.

## 2022-04-12 ENCOUNTER — OFFICE VISIT (OUTPATIENT)
Dept: OPHTHALMOLOGY | Facility: CLINIC | Age: 47
End: 2022-04-12
Attending: OPHTHALMOLOGY
Payer: COMMERCIAL

## 2022-04-12 DIAGNOSIS — R76.12 POSITIVE QUANTIFERON-TB GOLD TEST: ICD-10-CM

## 2022-04-12 DIAGNOSIS — H20.021 RECURRENT IRITIS OF RIGHT EYE: Primary | ICD-10-CM

## 2022-04-12 DIAGNOSIS — H40.003 GLAUCOMA SUSPECT OF BOTH EYES: ICD-10-CM

## 2022-04-12 PROCEDURE — G0463 HOSPITAL OUTPT CLINIC VISIT: HCPCS

## 2022-04-12 PROCEDURE — 99213 OFFICE O/P EST LOW 20 MIN: CPT | Performed by: OPHTHALMOLOGY

## 2022-04-12 RX ORDER — PREDNISOLONE ACETATE 10 MG/ML
SUSPENSION/ DROPS OPHTHALMIC
Qty: 5 ML | Refills: 3 | Status: SHIPPED | OUTPATIENT
Start: 2022-04-12

## 2022-04-12 ASSESSMENT — TONOMETRY
OS_IOP_MMHG: 12
OD_IOP_MMHG: 14
IOP_METHOD: TONOPEN

## 2022-04-12 ASSESSMENT — EXTERNAL EXAM - RIGHT EYE: OD_EXAM: NORMAL

## 2022-04-12 ASSESSMENT — CONF VISUAL FIELD
OS_NORMAL: 1
METHOD: COUNTING FINGERS
OD_NORMAL: 1

## 2022-04-12 ASSESSMENT — SLIT LAMP EXAM - LIDS
COMMENTS: NORMAL
COMMENTS: NORMAL

## 2022-04-12 ASSESSMENT — VISUAL ACUITY
OS_PH_SC: 20/30
OD_SC+: -2
METHOD: SNELLEN - LINEAR
OS_SC+: -2
OS_PH_SC+: -2
OS_SC: 20/50
OD_PH_SC: 20/30
OD_SC: 20/40

## 2022-04-12 ASSESSMENT — EXTERNAL EXAM - LEFT EYE: OS_EXAM: NORMAL

## 2022-04-12 ASSESSMENT — CUP TO DISC RATIO
OS_RATIO: 0.7
OD_RATIO: 0.7

## 2022-04-12 NOTE — PROGRESS NOTES
Chief Complaint/Presenting Concern:  Uveitis follow up    Interval History of Present Ocular Illness:  Sarah Andrade is a 46 year old patient who returns for follow up of her recurrent iritis of the right eye. At last visit in February 2022, things were doing well, so we tapered steroid drop to 2x/day in the right eye. Overall, doing well.    Interval Updates to Medical/Family/Social History:  Health and family doing well. Was unable to get Infectious disease appointment.     Relevant Review of Systems Updates:  No coughs/colds    Laboratory Testing: None recently     Current eye related medications: Prednisolone 2x/day right eye     Retina/Uveitis Imaging: None today    Assessment:     1. Recurrent iritis of right eye  No symptoms but few cells today    2. Glaucoma suspect of both eyes  With cupping but normal IOP    3. Positive QuantiFERON-TB Gold test  Awaiting ID visit    Plan/Recommendations:      Discussed findings with patient and her Aunt. Although no symptoms, a few cells remain in the right eye. As such, we should continue this frequency of steroid drops without taper    Eye pressure is normal in each eye, so this can be monitored    Recommend additional testing: None for us specifically    Continue Prednisolone drop 2x/day in the right eye     No other medicines at this time. Given stability of the uveitis, no urgency for prophylactic TB treatment but will await Dr. Harris's recommendation    RTC  1. Dr. Harris 4/26/22. Will kindly ask if possible to switch to phone visit    2. [place] late June-early July Tonopen, AC Check, dilate right eye, no testing    Physician Attestation     Attending Physician Attestation:  Complete documentation of historical and exam elements from today's encounter can be found in the full encounter summary report (not reduplicated in this progress note). I personally obtained the chief complaint(s) and history of present illness. I confirmed and edited as necessary the review  of systems, past medical/surgical history, family history, social history, and examination findings as documented by others; and I examined the patient myself. I personally reviewed the relevant tests, images, and reports as documented above. I formulated and edited as necessary the assessment and plan and discussed the findings and management plan with the patient and family members present at the time of this visit.  Dontae Cabello M.D., Uveitis and Medical Retina, April 12, 2022

## 2022-04-12 NOTE — LETTER
4/12/2022       RE: Sarah Andrade  [address]  [facility] [address]     Dear Colleague,    Thank you for referring your patient, Sarah Andrade, to the [facility] CLIN[facility] - [place] at [facility]. Please see a copy of my visit note below.    Chief Complaint/Presenting Concern:  Uveitis follow up.    Interval History of Present Ocular Illness:  Sarah Andrade is a 46 year old patient who returns for follow up of her recurrent iritis of the right eye. At last visit in February 2022, things were doing well, so we tapered steroid drop to 2x/day in the right eye. Overall, doing well.    Interval Updates to Medical/Family/Social History:  Health and family doing well. Was unable to get Infectious disease appointment.     Relevant Review of Systems Updates:  No coughs/colds    Laboratory Testing: None recently     Current eye related medications: Prednisolone 2x/day right eye     Retina/Uveitis Imaging: None today    Assessment:     1. Recurrent iritis of right eye  No symptoms but few cells today    2. Glaucoma suspect of both eyes  With cupping but normal IOP  3. Positive QuantiFERON-TB Gold test  Awaiting ID visit    Plan/Recommendations:      Discussed findings with patient and her Aunt. Although no symptoms, a few cells remain in the right eye. As such, we should continue this frequency of steroid drops without taper.    Eye pressure is normal in each eye, so this can be monitored.    Recommend additional testing: None for us specifically.    Continue Prednisolone drop 2x/day in the right eye.    No other medicines at this time. Given stability of the uveitis, no urgency for prophylactic TB treatment but will await Dr. Harris's recommendation.    RTC  1. Dr. Harris 4/26/22. Will kindly ask if possible to switch to phone visit    2. [place] late June-early July Tonopen, AC Check, dilate right eye, no testing    Attending Physician Attestation:   Complete documentation of historical and exam elements from today's encounter can be found in the full encounter summary report (not reduplicated in this progress note). I personally obtained the chief complaint(s) and history of present illness. I confirmed and edited as necessary the review of systems, past medical/surgical history, family history, social history, and examination findings as documented by others; and I examined the patient myself. I personally reviewed the relevant tests, images, and reports as documented above. I formulated and edited as necessary the assessment and plan and discussed the findings and management plan with the patient and family members present at the time of this visit.  Dontae Cabello M.D., Uveitis and Medical Retina, April 12, 2022     Again, thank you for allowing me to participate in the care of your patient.    Sincerely,    Dontae Cabello MD  [facility] Dept of Ophthalmology  Uveitis and Medical Retina

## 2022-04-12 NOTE — NURSING NOTE
Chief Complaints and History of Present Illnesses   Patient presents with     Uveitis Follow-Up     Chief Complaint(s) and History of Present Illness(es)     Uveitis Follow-Up     Laterality: right eye    Onset: sudden    Onset: months ago    Severity: moderate    Associated symptoms: Negative for photophobia, flashes and floaters    Treatments tried: artificial tears    Response to treatment: moderate improvement    Pain scale: 0/10              Comments     Here for acute anterior uveitis of right eye.    Prednisolone BID right eye.    Quan Nichols COT 3:51 PM April 12, 2022

## 2022-04-26 ENCOUNTER — VIRTUAL VISIT (OUTPATIENT)
Dept: INFECTIOUS DISEASES | Facility: CLINIC | Age: 47
End: 2022-04-26
Attending: INTERNAL MEDICINE
Payer: COMMERCIAL

## 2022-04-26 DIAGNOSIS — H20.9 ANTERIOR UVEITIS: ICD-10-CM

## 2022-04-26 DIAGNOSIS — R76.12 POSITIVE QUANTIFERON-TB GOLD TEST: Primary | ICD-10-CM

## 2022-04-26 PROCEDURE — 99214 OFFICE O/P EST MOD 30 MIN: CPT | Mod: 95 | Performed by: INTERNAL MEDICINE

## 2022-04-26 PROCEDURE — G0463 HOSPITAL OUTPT CLINIC VISIT: HCPCS | Mod: PN,RTG | Performed by: INTERNAL MEDICINE

## 2022-04-26 RX ORDER — ISONIAZID 300 MG/1
300 TABLET ORAL DAILY
Qty: 30 TABLET | Refills: 8 | Status: SHIPPED | OUTPATIENT
Start: 2022-04-26

## 2022-04-26 NOTE — LETTER
Date:May 2, 2022      Provider requested that no letter be sent. Do not send.       [facility]

## 2022-04-26 NOTE — LETTER
4/26/2022       RE: Sarah Andrade  [address]  [facility] [address]     Dear Colleague,    Thank you for referring your patient, Sarah Andrade, to the [facility]. Please see a copy of my visit note below.    Sarah is a 46 year old who is being evaluated via a billable video visit.    Patient was unable to do a video visit so it was converted to a telephone visit.   Subjective   Sarah is a 46 year old who presents for the following health issues, latent TB,  accompanied by her .    HPI   Patient has latent TB. I saw her in Late Nov. 2021. She was to start  mg po Q day at that time and then come back and see me in Jan. She did not keep the Jan. appt. Also she never picked up her medication from the pharmacy so she has not started it all. She thgouth the pharmacy would call her but they did not.   She is willing to start the INH now.  No acute c/o today.     Review of Systems   CONSTITUTIONAL: NEGATIVE for fever, chills, change in weight  ENT/MOUTH: NEGATIVE for ear, mouth and throat problems  RESP: NEGATIVE for significant cough or SOB  CV: NEGATIVE for chest pain, palpitations or peripheral edema      Objective         Vitals:  No vitals were obtained today due to virtual visit.    Physical Exam   GENERAL: Healthy, alert and no distress  RESP: No audible wheeze, cough, or visible cyanosis.    NEURO: Mentation and speech appropriate for age.  PSYCH: Mentation appears normal, affect normal/bright, judgement and insight intact, normal speech.      Lab on 11/11/2021   Component Date Value Ref Range Status     Sodium 11/11/2021 138  133 - 144 mmol/L Final    This is a corrected result. Previous result was 142 mmol/L on 11/11/2021 at 11:33 AM CST     Potassium 11/11/2021 3.7  3.4 - 5.3 mmol/L Final     Chloride 11/11/2021 110 (A) 94 - 109 mmol/L Final     Carbon Dioxide (CO2)  11/11/2021 23  20 - 32 mmol/L Final     Anion Gap 11/11/2021 5  3 - 14 mmol/L Final     Urea Nitrogen 11/11/2021 6 (A) 7 - 30 mg/dL Final     Creatinine 11/11/2021 0.48 (A) 0.52 - 1.04 mg/dL Final     Calcium 11/11/2021 8.8  8.5 - 10.1 mg/dL Final     Glucose 11/11/2021 87  70 - 99 mg/dL Final     Alkaline Phosphatase 11/11/2021 47  40 - 150 U/L Final     AST 11/11/2021 14  0 - 45 U/L Final     ALT 11/11/2021 15  0 - 50 U/L Final     Protein Total 11/11/2021 7.9  6.8 - 8.8 g/dL Final     Albumin 11/11/2021 3.3 (A) 3.4 - 5.0 g/dL Final     Bilirubin Total 11/11/2021 0.3  0.2 - 1.3 mg/dL Final     GFR Estimate 11/11/2021 >90  >60 mL/min/1.73m2 Final    As of July 11, 2021, eGFR is calculated by the CKD-EPI creatinine equation, without race adjustment. eGFR can be influenced by muscle mass, exercise, and diet. The reported eGFR is an estimation only and is only applicable if the renal function is stable.     Erythrocyte Sedimentation Rate 11/11/2021 52 (A) 0 - 20 mm/hr Final     CRP Inflammation 11/11/2021 <2.9  0.0 - 8.0 mg/L Final     HIV Antigen Antibody Combo 11/11/2021 Nonreactive  Nonreactive Final    HIV-1 p24 Ag & HIV-1/HIV-2 Ab Not Detected     Hepatitis C Antibody 11/11/2021 Reactive (A) Nonreactive Final    A reactive result indicates one of the following   1) Current HCV infection   2) Past HCV infection that has resolved or   3) False positivity.     The CDC recommends that a reactive result should be followed by Nucleic acid testing for HCV RNA. If HCV RNA is detected, that indicates current HCV infection.   If HCV RNA is not detected, that indicates either past, resolved HCV infection, or false HCV antibody positivity.     Hepatitis B Surface Antigen 11/11/2021 Nonreactive  Nonreactive Final     Hepatitis B Surface Antibody 11/11/2021 >1,000.00 (A) <8.00 m[IU]/mL Final    Reactive, Patient is considered to be immune to infection with hepatitis B when the value is greater than or equal to 12.0 mIU/mL.      Hepatitis B Core Antibody Total 11/11/2021 Reactive (A) Nonreactive Final    A reactive result indicates acute, chronic or past/resolved hepatitis B infection.     WBC Count 11/11/2021 4.7  4.0 - 11.0 10e3/uL Final     RBC Count 11/11/2021 3.90  3.80 - 5.20 10e6/uL Final     Hemoglobin 11/11/2021 10.4 (A) 11.7 - 15.7 g/dL Final     Hematocrit 11/11/2021 33.6 (A) 35.0 - 47.0 % Final     MCV 11/11/2021 86  78 - 100 fL Final     MCH 11/11/2021 26.7  26.5 - 33.0 pg Final     MCHC 11/11/2021 31.0 (A) 31.5 - 36.5 g/dL Final     RDW 11/11/2021 13.5  10.0 - 15.0 % Final     Platelet Count 11/11/2021 380  150 - 450 10e3/uL Final     % Neutrophils 11/11/2021 52  % Final     % Lymphocytes 11/11/2021 37  % Final     % Monocytes 11/11/2021 8  % Final     % Eosinophils 11/11/2021 2  % Final     % Basophils 11/11/2021 1  % Final     % Immature Granulocytes 11/11/2021 0  % Final     NRBCs per 100 WBC 11/11/2021 0  <1 /100 Final     Absolute Neutrophils 11/11/2021 2.5  1.6 - 8.3 10e3/uL Final     Absolute Lymphocytes 11/11/2021 1.8  0.8 - 5.3 10e3/uL Final     Absolute Monocytes 11/11/2021 0.4  0.0 - 1.3 10e3/uL Final     Absolute Eosinophils 11/11/2021 0.1  0.0 - 0.7 10e3/uL Final     Absolute Basophils 11/11/2021 0.0  0.0 - 0.2 10e3/uL Final     Absolute Immature Granulocytes 11/11/2021 0.0  <=0.0 10e3/uL Final     Absolute NRBCs 11/11/2021 0.0  10e3/uL Final     Hepatitis B Surface Antibody 11/11/2021 >1,000.00 (A) <8.00 m[IU]/mL Final    Reactive, Patient is considered to be immune to infection with hepatitis B when the value is greater than or equal to 12.0 mIU/mL.     No results found for any visits on 04/26/22.    Assessment & Plan     Diagnoses and all orders for this visit:    Positive QuantiFERON-TB Gold test  -     isoniazid (NYDRAZID) 300 MG tablet; Take 1 tablet (300 mg) by mouth daily    Anterior uveitis  -     isoniazid (NYDRAZID) 300 MG tablet; Take 1 tablet (300 mg) by mouth daily      Latent TB will start INH now.  Needs labs one month after starting isoniazid.  RTC one month.            Video-Visit Details    Type of service:  Video Visit, changed to telephone visit.   Visit total  Time:9am to 9:30 am. 30 minutes.     Originating Location (pt. Location): Other work    Distant Location (provider location):  [facility] [place]     Platform used for Video Visit: Other: telephone        Again, thank you for allowing me to participate in the care of your patient.      Sincerely,    Lesly Harris MD

## 2022-04-29 NOTE — PATIENT INSTRUCTIONS
isoniazid from your pharmacy and start taking one pill a day. Return to clinic for labs and to see me in one month.

## 2022-04-29 NOTE — PROGRESS NOTES
Sarah is a 46 year old who is being evaluated via a billable video visit.    Patient was unable to do a video visit so it was converted to a telephone visit.   Subjective   Sarah is a 46 year old who presents for the following health issues, latent TB,  accompanied by her .    HPI   Patient has latent TB. I saw her in Late Nov. 2021. She was to start  mg po Q day at that time and then come back and see me in Jan. She did not keep the Jan. appt. Also she never picked up her medication from the pharmacy so she has not started it all. She thgouth the pharmacy would call her but they did not.   She is willing to start the INH now.  No acute c/o today.     Review of Systems   CONSTITUTIONAL: NEGATIVE for fever, chills, change in weight  ENT/MOUTH: NEGATIVE for ear, mouth and throat problems  RESP: NEGATIVE for significant cough or SOB  CV: NEGATIVE for chest pain, palpitations or peripheral edema      Objective         Vitals:  No vitals were obtained today due to virtual visit.    Physical Exam   GENERAL: Healthy, alert and no distress  RESP: No audible wheeze, cough, or visible cyanosis.    NEURO: Mentation and speech appropriate for age.  PSYCH: Mentation appears normal, affect normal/bright, judgement and insight intact, normal speech.      Lab on 11/11/2021   Component Date Value Ref Range Status     Sodium 11/11/2021 138  133 - 144 mmol/L Final    This is a corrected result. Previous result was 142 mmol/L on 11/11/2021 at 11:33 AM CST     Potassium 11/11/2021 3.7  3.4 - 5.3 mmol/L Final     Chloride 11/11/2021 110 (A) 94 - 109 mmol/L Final     Carbon Dioxide (CO2) 11/11/2021 23  20 - 32 mmol/L Final     Anion Gap 11/11/2021 5  3 - 14 mmol/L Final     Urea Nitrogen 11/11/2021 6 (A) 7 - 30 mg/dL Final     Creatinine 11/11/2021 0.48 (A) 0.52 - 1.04 mg/dL Final     Calcium 11/11/2021 8.8  8.5 - 10.1 mg/dL Final     Glucose 11/11/2021 87  70 - 99 mg/dL Final     Alkaline Phosphatase 11/11/2021 47  40  - 150 U/L Final     AST 11/11/2021 14  0 - 45 U/L Final     ALT 11/11/2021 15  0 - 50 U/L Final     Protein Total 11/11/2021 7.9  6.8 - 8.8 g/dL Final     Albumin 11/11/2021 3.3 (A) 3.4 - 5.0 g/dL Final     Bilirubin Total 11/11/2021 0.3  0.2 - 1.3 mg/dL Final     GFR Estimate 11/11/2021 >90  >60 mL/min/1.73m2 Final    As of July 11, 2021, eGFR is calculated by the CKD-EPI creatinine equation, without race adjustment. eGFR can be influenced by muscle mass, exercise, and diet. The reported eGFR is an estimation only and is only applicable if the renal function is stable.     Erythrocyte Sedimentation Rate 11/11/2021 52 (A) 0 - 20 mm/hr Final     CRP Inflammation 11/11/2021 <2.9  0.0 - 8.0 mg/L Final     HIV Antigen Antibody Combo 11/11/2021 Nonreactive  Nonreactive Final    HIV-1 p24 Ag & HIV-1/HIV-2 Ab Not Detected     Hepatitis C Antibody 11/11/2021 Reactive (A) Nonreactive Final    A reactive result indicates one of the following   1) Current HCV infection   2) Past HCV infection that has resolved or   3) False positivity.     The CDC recommends that a reactive result should be followed by Nucleic acid testing for HCV RNA. If HCV RNA is detected, that indicates current HCV infection.   If HCV RNA is not detected, that indicates either past, resolved HCV infection, or false HCV antibody positivity.     Hepatitis B Surface Antigen 11/11/2021 Nonreactive  Nonreactive Final     Hepatitis B Surface Antibody 11/11/2021 >1,000.00 (A) <8.00 m[IU]/mL Final    Reactive, Patient is considered to be immune to infection with hepatitis B when the value is greater than or equal to 12.0 mIU/mL.     Hepatitis B Core Antibody Total 11/11/2021 Reactive (A) Nonreactive Final    A reactive result indicates acute, chronic or past/resolved hepatitis B infection.     WBC Count 11/11/2021 4.7  4.0 - 11.0 10e3/uL Final     RBC Count 11/11/2021 3.90  3.80 - 5.20 10e6/uL Final     Hemoglobin 11/11/2021 10.4 (A) 11.7 - 15.7 g/dL Final      Hematocrit 11/11/2021 33.6 (A) 35.0 - 47.0 % Final     MCV 11/11/2021 86  78 - 100 fL Final     MCH 11/11/2021 26.7  26.5 - 33.0 pg Final     MCHC 11/11/2021 31.0 (A) 31.5 - 36.5 g/dL Final     RDW 11/11/2021 13.5  10.0 - 15.0 % Final     Platelet Count 11/11/2021 380  150 - 450 10e3/uL Final     % Neutrophils 11/11/2021 52  % Final     % Lymphocytes 11/11/2021 37  % Final     % Monocytes 11/11/2021 8  % Final     % Eosinophils 11/11/2021 2  % Final     % Basophils 11/11/2021 1  % Final     % Immature Granulocytes 11/11/2021 0  % Final     NRBCs per 100 WBC 11/11/2021 0  <1 /100 Final     Absolute Neutrophils 11/11/2021 2.5  1.6 - 8.3 10e3/uL Final     Absolute Lymphocytes 11/11/2021 1.8  0.8 - 5.3 10e3/uL Final     Absolute Monocytes 11/11/2021 0.4  0.0 - 1.3 10e3/uL Final     Absolute Eosinophils 11/11/2021 0.1  0.0 - 0.7 10e3/uL Final     Absolute Basophils 11/11/2021 0.0  0.0 - 0.2 10e3/uL Final     Absolute Immature Granulocytes 11/11/2021 0.0  <=0.0 10e3/uL Final     Absolute NRBCs 11/11/2021 0.0  10e3/uL Final     Hepatitis B Surface Antibody 11/11/2021 >1,000.00 (A) <8.00 m[IU]/mL Final    Reactive, Patient is considered to be immune to infection with hepatitis B when the value is greater than or equal to 12.0 mIU/mL.     No results found for any visits on 04/26/22.    Assessment & Plan     Diagnoses and all orders for this visit:    Positive QuantiFERON-TB Gold test  -     isoniazid (NYDRAZID) 300 MG tablet; Take 1 tablet (300 mg) by mouth daily    Anterior uveitis  -     isoniazid (NYDRAZID) 300 MG tablet; Take 1 tablet (300 mg) by mouth daily      Latent TB will start INH now. Needs labs one month after starting isoniazid.  RTC one month.            Video-Visit Details    Type of service:  Video Visit, changed to telephone visit.   Visit total  Time:9am to 9:30 am. 30 minutes.     Originating Location (pt. Location): Other work    Distant Location (provider location):  [facility] INFECTIOUS DISEASE  [facility]     Platform used for Video Visit: Other: telephone